# Patient Record
Sex: MALE | Race: WHITE | NOT HISPANIC OR LATINO | Employment: OTHER | URBAN - METROPOLITAN AREA
[De-identification: names, ages, dates, MRNs, and addresses within clinical notes are randomized per-mention and may not be internally consistent; named-entity substitution may affect disease eponyms.]

---

## 2024-05-31 ENCOUNTER — HOSPITAL ENCOUNTER (INPATIENT)
Facility: HOSPITAL | Age: 84
LOS: 4 days | Discharge: HOME | DRG: 392 | End: 2024-06-04
Attending: INTERNAL MEDICINE | Admitting: INTERNAL MEDICINE
Payer: MEDICARE

## 2024-05-31 ENCOUNTER — APPOINTMENT (OUTPATIENT)
Dept: RADIOLOGY | Facility: HOSPITAL | Age: 84
DRG: 392 | End: 2024-05-31
Payer: MEDICARE

## 2024-05-31 ENCOUNTER — APPOINTMENT (OUTPATIENT)
Dept: CARDIOLOGY | Facility: HOSPITAL | Age: 84
DRG: 392 | End: 2024-05-31
Payer: MEDICARE

## 2024-05-31 DIAGNOSIS — R60.0 LOWER EXTREMITY EDEMA: ICD-10-CM

## 2024-05-31 DIAGNOSIS — R06.02 SHORTNESS OF BREATH: Primary | ICD-10-CM

## 2024-05-31 DIAGNOSIS — R60.0 EDEMA OF LEFT UPPER EXTREMITY: ICD-10-CM

## 2024-05-31 DIAGNOSIS — R62.7 FAILURE TO THRIVE IN ADULT: ICD-10-CM

## 2024-05-31 LAB
ALBUMIN SERPL BCP-MCNC: 3.2 G/DL (ref 3.4–5)
ALP SERPL-CCNC: 73 U/L (ref 33–136)
ALT SERPL W P-5'-P-CCNC: 13 U/L (ref 10–52)
ANION GAP SERPL CALC-SCNC: 10 MMOL/L (ref 10–20)
APPEARANCE UR: CLEAR
AST SERPL W P-5'-P-CCNC: 24 U/L (ref 9–39)
BASOPHILS # BLD AUTO: ABNORMAL 10*3/UL
BASOPHILS NFR BLD AUTO: ABNORMAL %
BILIRUB SERPL-MCNC: 1.3 MG/DL (ref 0–1.2)
BILIRUB UR STRIP.AUTO-MCNC: NEGATIVE MG/DL
BNP SERPL-MCNC: 2235 PG/ML (ref 0–99)
BUN SERPL-MCNC: 26 MG/DL (ref 6–23)
CALCIUM SERPL-MCNC: 8.9 MG/DL (ref 8.6–10.3)
CARDIAC TROPONIN I PNL SERPL HS: 49 NG/L (ref 0–20)
CARDIAC TROPONIN I PNL SERPL HS: 60 NG/L (ref 0–20)
CHLORIDE SERPL-SCNC: 91 MMOL/L (ref 98–107)
CO2 SERPL-SCNC: 34 MMOL/L (ref 21–32)
COLOR UR: YELLOW
CREAT SERPL-MCNC: 0.78 MG/DL (ref 0.5–1.3)
EGFRCR SERPLBLD CKD-EPI 2021: 88 ML/MIN/1.73M*2
EOSINOPHIL # BLD AUTO: ABNORMAL 10*3/UL
EOSINOPHIL NFR BLD AUTO: ABNORMAL %
ERYTHROCYTE [DISTWIDTH] IN BLOOD BY AUTOMATED COUNT: 26.3 % (ref 11.5–14.5)
GLUCOSE BLD MANUAL STRIP-MCNC: 156 MG/DL (ref 74–99)
GLUCOSE SERPL-MCNC: 159 MG/DL (ref 74–99)
GLUCOSE UR STRIP.AUTO-MCNC: NORMAL MG/DL
HCT VFR BLD AUTO: 38 % (ref 41–52)
HGB BLD-MCNC: 10.9 G/DL (ref 13.5–17.5)
HYALINE CASTS #/AREA URNS AUTO: ABNORMAL /LPF
IMM GRANULOCYTES NFR BLD AUTO: 0.6 % (ref 0–0.9)
INR PPP: 1.3 (ref 0.9–1.1)
KETONES UR STRIP.AUTO-MCNC: NEGATIVE MG/DL
LACTATE SERPL-SCNC: 1.4 MMOL/L (ref 0.4–2)
LEUKOCYTE ESTERASE UR QL STRIP.AUTO: NEGATIVE
LYMPHOCYTES # BLD AUTO: ABNORMAL 10*3/UL
LYMPHOCYTES NFR BLD AUTO: ABNORMAL %
MAGNESIUM SERPL-MCNC: 1.7 MG/DL (ref 1.6–2.4)
MCH RBC QN AUTO: 24.8 PG (ref 26–34)
MCHC RBC AUTO-ENTMCNC: 28.7 G/DL (ref 32–36)
MCV RBC AUTO: 86 FL (ref 80–100)
MONOCYTES # BLD AUTO: ABNORMAL 10*3/UL
MONOCYTES NFR BLD AUTO: ABNORMAL %
MUCOUS THREADS #/AREA URNS AUTO: ABNORMAL /LPF
NEUTROPHILS # BLD AUTO: ABNORMAL 10*3/UL
NEUTROPHILS NFR BLD AUTO: ABNORMAL %
NITRITE UR QL STRIP.AUTO: NEGATIVE
NRBC BLD-RTO: 0 /100 WBCS (ref 0–0)
PH UR STRIP.AUTO: 5.5 [PH]
PLATELET # BLD AUTO: 165 X10*3/UL (ref 150–450)
POTASSIUM SERPL-SCNC: 3.6 MMOL/L (ref 3.5–5.3)
PROT SERPL-MCNC: 6.1 G/DL (ref 6.4–8.2)
PROT UR STRIP.AUTO-MCNC: NORMAL MG/DL
PROTHROMBIN TIME: 14.7 SECONDS (ref 9.8–12.8)
RBC # BLD AUTO: 4.4 X10*6/UL (ref 4.5–5.9)
RBC # UR STRIP.AUTO: NEGATIVE /UL
RBC #/AREA URNS AUTO: ABNORMAL /HPF
SARS-COV-2 RNA RESP QL NAA+PROBE: NOT DETECTED
SODIUM SERPL-SCNC: 131 MMOL/L (ref 136–145)
SP GR UR STRIP.AUTO: 1.02
UROBILINOGEN UR STRIP.AUTO-MCNC: NORMAL MG/DL
WBC # BLD AUTO: 6.4 X10*3/UL (ref 4.4–11.3)
WBC #/AREA URNS AUTO: ABNORMAL /HPF

## 2024-05-31 PROCEDURE — 84075 ASSAY ALKALINE PHOSPHATASE: CPT | Performed by: INTERNAL MEDICINE

## 2024-05-31 PROCEDURE — 84443 ASSAY THYROID STIM HORMONE: CPT | Performed by: HOSPITALIST

## 2024-05-31 PROCEDURE — 83540 ASSAY OF IRON: CPT | Performed by: HOSPITALIST

## 2024-05-31 PROCEDURE — 81001 URINALYSIS AUTO W/SCOPE: CPT | Performed by: INTERNAL MEDICINE

## 2024-05-31 PROCEDURE — 36415 COLL VENOUS BLD VENIPUNCTURE: CPT | Performed by: INTERNAL MEDICINE

## 2024-05-31 PROCEDURE — 84484 ASSAY OF TROPONIN QUANT: CPT | Performed by: INTERNAL MEDICINE

## 2024-05-31 PROCEDURE — 71045 X-RAY EXAM CHEST 1 VIEW: CPT | Mod: FOREIGN READ | Performed by: RADIOLOGY

## 2024-05-31 PROCEDURE — 2500000001 HC RX 250 WO HCPCS SELF ADMINISTERED DRUGS (ALT 637 FOR MEDICARE OP): Performed by: INTERNAL MEDICINE

## 2024-05-31 PROCEDURE — 83735 ASSAY OF MAGNESIUM: CPT | Performed by: INTERNAL MEDICINE

## 2024-05-31 PROCEDURE — 83880 ASSAY OF NATRIURETIC PEPTIDE: CPT | Performed by: INTERNAL MEDICINE

## 2024-05-31 PROCEDURE — 82746 ASSAY OF FOLIC ACID SERUM: CPT | Mod: AHULAB | Performed by: HOSPITALIST

## 2024-05-31 PROCEDURE — 93970 EXTREMITY STUDY: CPT

## 2024-05-31 PROCEDURE — 93005 ELECTROCARDIOGRAM TRACING: CPT

## 2024-05-31 PROCEDURE — 71045 X-RAY EXAM CHEST 1 VIEW: CPT

## 2024-05-31 PROCEDURE — 99223 1ST HOSP IP/OBS HIGH 75: CPT | Performed by: HOSPITALIST

## 2024-05-31 PROCEDURE — 99285 EMERGENCY DEPT VISIT HI MDM: CPT | Mod: 25

## 2024-05-31 PROCEDURE — 1200000002 HC GENERAL ROOM WITH TELEMETRY DAILY

## 2024-05-31 PROCEDURE — 83605 ASSAY OF LACTIC ACID: CPT | Performed by: INTERNAL MEDICINE

## 2024-05-31 PROCEDURE — 82947 ASSAY GLUCOSE BLOOD QUANT: CPT

## 2024-05-31 PROCEDURE — 87635 SARS-COV-2 COVID-19 AMP PRB: CPT | Performed by: INTERNAL MEDICINE

## 2024-05-31 PROCEDURE — 85610 PROTHROMBIN TIME: CPT | Performed by: INTERNAL MEDICINE

## 2024-05-31 RX ORDER — ACETAMINOPHEN 325 MG/1
650 TABLET ORAL EVERY 4 HOURS PRN
Status: DISCONTINUED | OUTPATIENT
Start: 2024-05-31 | End: 2024-06-04 | Stop reason: HOSPADM

## 2024-05-31 RX ORDER — DULOXETIN HYDROCHLORIDE 20 MG/1
20 CAPSULE, DELAYED RELEASE ORAL 2 TIMES DAILY
Status: DISCONTINUED | OUTPATIENT
Start: 2024-05-31 | End: 2024-06-01 | Stop reason: DRUGHIGH

## 2024-05-31 RX ORDER — ACETAMINOPHEN 650 MG/1
650 SUPPOSITORY RECTAL EVERY 4 HOURS PRN
Status: DISCONTINUED | OUTPATIENT
Start: 2024-05-31 | End: 2024-06-04 | Stop reason: HOSPADM

## 2024-05-31 RX ORDER — LEVOTHYROXINE SODIUM 75 UG/1
75 TABLET ORAL DAILY
Status: DISCONTINUED | OUTPATIENT
Start: 2024-06-01 | End: 2024-06-04 | Stop reason: HOSPADM

## 2024-05-31 RX ORDER — ONDANSETRON HYDROCHLORIDE 2 MG/ML
4 INJECTION, SOLUTION INTRAVENOUS EVERY 8 HOURS PRN
Status: DISCONTINUED | OUTPATIENT
Start: 2024-05-31 | End: 2024-06-04 | Stop reason: HOSPADM

## 2024-05-31 RX ORDER — TAMSULOSIN HYDROCHLORIDE 0.4 MG/1
0.4 CAPSULE ORAL DAILY
Status: DISCONTINUED | OUTPATIENT
Start: 2024-06-01 | End: 2024-06-04 | Stop reason: HOSPADM

## 2024-05-31 RX ORDER — ONDANSETRON 4 MG/1
4 TABLET, ORALLY DISINTEGRATING ORAL EVERY 8 HOURS PRN
Status: DISCONTINUED | OUTPATIENT
Start: 2024-05-31 | End: 2024-06-04 | Stop reason: HOSPADM

## 2024-05-31 RX ORDER — POLYETHYLENE GLYCOL 3350 17 G/17G
17 POWDER, FOR SOLUTION ORAL DAILY
Status: DISCONTINUED | OUTPATIENT
Start: 2024-06-01 | End: 2024-06-04 | Stop reason: HOSPADM

## 2024-05-31 RX ORDER — PANTOPRAZOLE SODIUM 40 MG/10ML
40 INJECTION, POWDER, LYOPHILIZED, FOR SOLUTION INTRAVENOUS DAILY
Status: DISCONTINUED | OUTPATIENT
Start: 2024-06-01 | End: 2024-06-04 | Stop reason: HOSPADM

## 2024-05-31 RX ORDER — ATORVASTATIN CALCIUM 40 MG/1
40 TABLET, FILM COATED ORAL ONCE
Status: COMPLETED | OUTPATIENT
Start: 2024-05-31 | End: 2024-05-31

## 2024-05-31 RX ORDER — DULOXETIN HYDROCHLORIDE 20 MG/1
20 CAPSULE, DELAYED RELEASE ORAL DAILY
Status: DISCONTINUED | OUTPATIENT
Start: 2024-06-01 | End: 2024-05-31

## 2024-05-31 RX ORDER — INSULIN GLARGINE 100 [IU]/ML
3 INJECTION, SOLUTION SUBCUTANEOUS EVERY 24 HOURS
Status: DISCONTINUED | OUTPATIENT
Start: 2024-05-31 | End: 2024-06-04 | Stop reason: HOSPADM

## 2024-05-31 RX ORDER — ASPIRIN 81 MG/1
81 TABLET ORAL DAILY
Status: DISCONTINUED | OUTPATIENT
Start: 2024-06-01 | End: 2024-06-04 | Stop reason: HOSPADM

## 2024-05-31 RX ORDER — TALC
3 POWDER (GRAM) TOPICAL ONCE
Status: DISCONTINUED | OUTPATIENT
Start: 2024-06-01 | End: 2024-05-31

## 2024-05-31 RX ORDER — DEXTROSE 50 % IN WATER (D50W) INTRAVENOUS SYRINGE
12.5
Status: DISCONTINUED | OUTPATIENT
Start: 2024-05-31 | End: 2024-06-04 | Stop reason: HOSPADM

## 2024-05-31 RX ORDER — METOPROLOL SUCCINATE 25 MG/1
25 TABLET, EXTENDED RELEASE ORAL DAILY
Status: DISCONTINUED | OUTPATIENT
Start: 2024-06-01 | End: 2024-05-31

## 2024-05-31 RX ORDER — ENOXAPARIN SODIUM 100 MG/ML
40 INJECTION SUBCUTANEOUS EVERY 24 HOURS
Status: DISCONTINUED | OUTPATIENT
Start: 2024-05-31 | End: 2024-05-31

## 2024-05-31 RX ORDER — TALC
3 POWDER (GRAM) TOPICAL ONCE
Status: COMPLETED | OUTPATIENT
Start: 2024-05-31 | End: 2024-05-31

## 2024-05-31 RX ORDER — TALC
3 POWDER (GRAM) TOPICAL DAILY
Status: DISCONTINUED | OUTPATIENT
Start: 2024-06-01 | End: 2024-06-04 | Stop reason: HOSPADM

## 2024-05-31 RX ORDER — METOPROLOL SUCCINATE 25 MG/1
12.5 TABLET, EXTENDED RELEASE ORAL 2 TIMES DAILY
Status: DISCONTINUED | OUTPATIENT
Start: 2024-05-31 | End: 2024-06-04 | Stop reason: HOSPADM

## 2024-05-31 RX ORDER — SODIUM CHLORIDE 9 MG/ML
75 INJECTION, SOLUTION INTRAVENOUS CONTINUOUS
Status: ACTIVE | OUTPATIENT
Start: 2024-05-31 | End: 2024-06-01

## 2024-05-31 RX ORDER — DEXTROSE 50 % IN WATER (D50W) INTRAVENOUS SYRINGE
25
Status: DISCONTINUED | OUTPATIENT
Start: 2024-05-31 | End: 2024-06-04 | Stop reason: HOSPADM

## 2024-05-31 RX ORDER — FERROUS SULFATE, DRIED 160(50) MG
1 TABLET, EXTENDED RELEASE ORAL DAILY
Status: DISCONTINUED | OUTPATIENT
Start: 2024-06-01 | End: 2024-06-04 | Stop reason: HOSPADM

## 2024-05-31 RX ORDER — INSULIN LISPRO 100 [IU]/ML
0-10 INJECTION, SOLUTION INTRAVENOUS; SUBCUTANEOUS EVERY 4 HOURS
Status: DISCONTINUED | OUTPATIENT
Start: 2024-05-31 | End: 2024-06-04 | Stop reason: HOSPADM

## 2024-05-31 RX ADMIN — ATORVASTATIN CALCIUM 40 MG: 40 TABLET, FILM COATED ORAL at 21:54

## 2024-05-31 RX ADMIN — Medication 3 MG: at 21:54

## 2024-05-31 SDOH — ECONOMIC STABILITY: TRANSPORTATION INSECURITY
IN THE PAST 12 MONTHS, HAS LACK OF TRANSPORTATION KEPT YOU FROM MEETINGS, WORK, OR FROM GETTING THINGS NEEDED FOR DAILY LIVING?: NO

## 2024-05-31 SDOH — ECONOMIC STABILITY: INCOME INSECURITY: HOW HARD IS IT FOR YOU TO PAY FOR THE VERY BASICS LIKE FOOD, HOUSING, MEDICAL CARE, AND HEATING?: NOT HARD AT ALL

## 2024-05-31 SDOH — SOCIAL STABILITY: SOCIAL INSECURITY: HAVE YOU HAD ANY THOUGHTS OF HARMING ANYONE ELSE?: NO

## 2024-05-31 SDOH — SOCIAL STABILITY: SOCIAL INSECURITY: DOES ANYONE TRY TO KEEP YOU FROM HAVING/CONTACTING OTHER FRIENDS OR DOING THINGS OUTSIDE YOUR HOME?: NO

## 2024-05-31 SDOH — SOCIAL STABILITY: SOCIAL INSECURITY: ABUSE: ADULT

## 2024-05-31 SDOH — SOCIAL STABILITY: SOCIAL INSECURITY: DO YOU FEEL ANYONE HAS EXPLOITED OR TAKEN ADVANTAGE OF YOU FINANCIALLY OR OF YOUR PERSONAL PROPERTY?: NO

## 2024-05-31 SDOH — ECONOMIC STABILITY: INCOME INSECURITY: IN THE LAST 12 MONTHS, WAS THERE A TIME WHEN YOU WERE NOT ABLE TO PAY THE MORTGAGE OR RENT ON TIME?: NO

## 2024-05-31 SDOH — SOCIAL STABILITY: SOCIAL INSECURITY: DO YOU FEEL UNSAFE GOING BACK TO THE PLACE WHERE YOU ARE LIVING?: NO

## 2024-05-31 SDOH — ECONOMIC STABILITY: HOUSING INSECURITY
IN THE LAST 12 MONTHS, WAS THERE A TIME WHEN YOU DID NOT HAVE A STEADY PLACE TO SLEEP OR SLEPT IN A SHELTER (INCLUDING NOW)?: NO

## 2024-05-31 SDOH — SOCIAL STABILITY: SOCIAL INSECURITY: ARE YOU OR HAVE YOU BEEN THREATENED OR ABUSED PHYSICALLY, EMOTIONALLY, OR SEXUALLY BY ANYONE?: NO

## 2024-05-31 SDOH — ECONOMIC STABILITY: TRANSPORTATION INSECURITY
IN THE PAST 12 MONTHS, HAS THE LACK OF TRANSPORTATION KEPT YOU FROM MEDICAL APPOINTMENTS OR FROM GETTING MEDICATIONS?: NO

## 2024-05-31 SDOH — SOCIAL STABILITY: SOCIAL INSECURITY: WERE YOU ABLE TO COMPLETE ALL THE BEHAVIORAL HEALTH SCREENINGS?: YES

## 2024-05-31 SDOH — ECONOMIC STABILITY: HOUSING INSECURITY: IN THE LAST 12 MONTHS, HOW MANY PLACES HAVE YOU LIVED?: 1

## 2024-05-31 SDOH — SOCIAL STABILITY: SOCIAL INSECURITY: HAS ANYONE EVER THREATENED TO HURT YOUR FAMILY OR YOUR PETS?: NO

## 2024-05-31 SDOH — SOCIAL STABILITY: SOCIAL INSECURITY: ARE THERE ANY APPARENT SIGNS OF INJURIES/BEHAVIORS THAT COULD BE RELATED TO ABUSE/NEGLECT?: NO

## 2024-05-31 SDOH — SOCIAL STABILITY: SOCIAL INSECURITY: HAVE YOU HAD THOUGHTS OF HARMING ANYONE ELSE?: NO

## 2024-05-31 ASSESSMENT — COGNITIVE AND FUNCTIONAL STATUS - GENERAL
DRESSING REGULAR LOWER BODY CLOTHING: A LOT
EATING MEALS: A LOT
HELP NEEDED FOR BATHING: A LOT
MOBILITY SCORE: 9
PATIENT BASELINE BEDBOUND: NO
MOVING TO AND FROM BED TO CHAIR: A LOT
WALKING IN HOSPITAL ROOM: TOTAL
CLIMB 3 TO 5 STEPS WITH RAILING: TOTAL
STANDING UP FROM CHAIR USING ARMS: TOTAL
MOVING FROM LYING ON BACK TO SITTING ON SIDE OF FLAT BED WITH BEDRAILS: A LOT
DAILY ACTIVITIY SCORE: 12
TURNING FROM BACK TO SIDE WHILE IN FLAT BAD: A LOT
DRESSING REGULAR UPPER BODY CLOTHING: A LOT
TOILETING: A LOT
PERSONAL GROOMING: A LOT

## 2024-05-31 ASSESSMENT — COLUMBIA-SUICIDE SEVERITY RATING SCALE - C-SSRS
6. HAVE YOU EVER DONE ANYTHING, STARTED TO DO ANYTHING, OR PREPARED TO DO ANYTHING TO END YOUR LIFE?: NO
2. HAVE YOU ACTUALLY HAD ANY THOUGHTS OF KILLING YOURSELF?: NO
1. IN THE PAST MONTH, HAVE YOU WISHED YOU WERE DEAD OR WISHED YOU COULD GO TO SLEEP AND NOT WAKE UP?: NO

## 2024-05-31 ASSESSMENT — ACTIVITIES OF DAILY LIVING (ADL)
WALKS IN HOME: DEPENDENT
ASSISTIVE_DEVICE: WHEELCHAIR;WALKER
DRESSING YOURSELF: NEEDS ASSISTANCE
FEEDING YOURSELF: NEEDS ASSISTANCE
JUDGMENT_ADEQUATE_SAFELY_COMPLETE_DAILY_ACTIVITIES: NO
HEARING - LEFT EAR: FUNCTIONAL
ADEQUATE_TO_COMPLETE_ADL: YES
TOILETING: NEEDS ASSISTANCE
PATIENT'S MEMORY ADEQUATE TO SAFELY COMPLETE DAILY ACTIVITIES?: NO
BATHING: NEEDS ASSISTANCE
HEARING - RIGHT EAR: FUNCTIONAL
GROOMING: NEEDS ASSISTANCE

## 2024-05-31 ASSESSMENT — PATIENT HEALTH QUESTIONNAIRE - PHQ9
2. FEELING DOWN, DEPRESSED OR HOPELESS: NOT AT ALL
SUM OF ALL RESPONSES TO PHQ9 QUESTIONS 1 & 2: 0
1. LITTLE INTEREST OR PLEASURE IN DOING THINGS: NOT AT ALL

## 2024-05-31 ASSESSMENT — LIFESTYLE VARIABLES
AUDIT-C TOTAL SCORE: 0
AUDIT-C TOTAL SCORE: 0
HOW OFTEN DO YOU HAVE 6 OR MORE DRINKS ON ONE OCCASION: NEVER
HOW OFTEN DO YOU HAVE A DRINK CONTAINING ALCOHOL: NEVER
HOW MANY STANDARD DRINKS CONTAINING ALCOHOL DO YOU HAVE ON A TYPICAL DAY: PATIENT DOES NOT DRINK
SKIP TO QUESTIONS 9-10: 1

## 2024-05-31 ASSESSMENT — PAIN SCALES - GENERAL
PAINLEVEL_OUTOF10: 0 - NO PAIN
PAINLEVEL_OUTOF10: 0 - NO PAIN

## 2024-05-31 ASSESSMENT — PAIN - FUNCTIONAL ASSESSMENT: PAIN_FUNCTIONAL_ASSESSMENT: 0-10

## 2024-05-31 NOTE — ED PROVIDER NOTES
"HPI     CC: Shortness of Breath     HPI: Maverick Tai is a 83 y.o. male with a history of T2DM, CAD, AF on warfarin, HFpEF, acute on chronic hypoxic respiratory failure on home O2, multiple valve replacements c/b moderate to large pericardial effusion, HTN, HLD, GIB 2/2 angioectasia, hypothyroidism, moderate PCM, thrombocytopenia, anxiety/depression, hypogammaglobulinemia, adrenal adenoma, migraine, right dropfoot, lumbar stenosis, KATEY, CKD 3, recurrent falls, GERD, BPH, hypothyroidism, recent admission at [facility] for new transaminitis, aspiration pneumonia and GI bleed managed medically, discharged 5/29 to home, presents with report of shortness of breath.  Patient is confused, states he does not know if he is short of breath or not.  He is perseverating on needing to urinate before answering any questions.  He denies any pain currently.  He cannot tell me where he lives, states \"I believe I am in an extended area of swollen or nearby.\"  Admits to feeling tired.  Per recent DC summary from 5/29 from [facility], patient presented with severe anemia, change his mind multiple times about whether to proceed with endoscopic evaluation, they were considering hospice but decided to hold off.    ROS: 10-point review of systems was performed and is otherwise negative except as noted in HPI.    Limitations to history: Confusion    Independent Historians: None available    External Records Reviewed: Prior DC summary from [facility] 5/29/2023    Past Medical History: Noncontributory except per HPI     Past Surgical History: Noncontributory except per HPI     Family History: Reviewed and noncontributory     Social History:  Denies tobacco. Denies ETOH. Denies illicit drugs.    Social Determinants Affecting Care: N/A    Allergies   Allergen Reactions    Iodine Other     Other Reaction(s): Other: See Comments      flushing= radioactive iodine, not topoical    flushing= radioactive iodine, not topoical      Other Reaction(s): Other: See " "Comments  flushing= radioactive iodine, not topoical    Penicillins Other     Pt states he has a extreme penicillin toxicity.    ABSOLUTE  HEP INDUCED    Pt states he has a extreme penicillin toxicity.      ABSOLUTE  HEP INDUCED    Bee Pollen Other     Environmental: Sneezing, runny nose, watery eyes    Cephalosporins Other     Concern about potential liver problems    Cetirizine Other     increased LFT    Ciprofloxacin Other     possible liver toxcicity    Clarithromycin Other     possible liver problem.    Desloratadine GI intolerance, GI Upset and Other     \"hyper\"    Diphenhydramine Other     \"hyper\"    Doxycycline Other     possible liver dysfunction after administration.    Gentamicin Other     Possible per pt causes increases symptoms of tinnitus    Nickel Other     patch test confirmed by     Sitagliptin Rash     rash in inner thighs       Home Meds: No current outpatient medications     Physical Exam     ED Triage Vitals [05/31/24 1533]   Temperature Heart Rate Respirations BP   36.5 °C (97.7 °F) 95 18 133/74      Pulse Ox Temp Source Heart Rate Source Patient Position   94 % Tympanic Monitor Sitting      BP Location FiO2 (%)     Left arm --         Heart Rate:  [82-97]   Temperature:  [36.5 °C (97.7 °F)-36.9 °C (98.5 °F)]   Respirations:  [17-20]   BP: (121-143)/(61-95)   Height:  [177.8 cm (5' 10\")]   Weight:  [90.7 kg (200 lb)]   Pulse Ox:  [94 %-98 %]      Physical Exam  Vitals and nursing note reviewed.     CONSTITUTIONAL: Chronically ill-appearing elderly male in no acute distress.     HENMT: Head atraumatic. Airway patent. Nasal mucosa clear. Mouth with normal mucosa, clear oropharynx. Uvula midline. Neck supple.    EYES: Clear bilaterally, pupils equally round and reactive to light.   CARDIOVASCULAR: Normal rate, irregularly irregular rhythm.  Heart sounds S1, S2.  No murmurs, rubs or gallops. Normal pulses. Capillary refill < 2 sec.   RESPIRATORY: No increased work of breathing. Breath sounds " clear and equal bilaterally except for faint bibasilar crackles.  GASTROINTESTINAL: Abdomen soft, non-distended, non-tender. No rebound, no guarding. Normal bowel sounds.  Reducible ventral hernias.  GENITOURINARY:  No CVA tenderness.  MUSCULOSKELETAL: Spine appears normal, range of motion is not limited, no muscle or joint tenderness. No edema.  Anasarca, 3+ BLE edema bilateral lower extremities.  NEUROLOGICAL: Alert and oriented only to self, no asymmetry, moving all extremities equally.  SKIN: Warm, dry and intact. No rash or notable lesions.  PSYCHIATRIC: Slightly irritable.  HEME/LYMPH: No adenopathy or splenomegaly.    Diagnostic Results      ECG: ECGs read and interpreted by me. See ED Course, below, for interpretation.    Labs Reviewed   CBC WITH AUTO DIFFERENTIAL - Abnormal       Result Value    WBC 6.4      nRBC 0.0      RBC 4.40 (*)     Hemoglobin 10.9 (*)     Hematocrit 38.0 (*)     MCV 86      MCH 24.8 (*)     MCHC 28.7 (*)     RDW 26.3 (*)     Platelets 165      Neutrophils %        Immature Granulocytes %, Automated 0.6      Lymphocytes %        Monocytes %        Eosinophils %        Basophils %        Neutrophils Absolute        Lymphocytes Absolute        Monocytes Absolute        Eosinophils Absolute        Basophils Absolute       COMPREHENSIVE METABOLIC PANEL - Abnormal    Glucose 159 (*)     Sodium 131 (*)     Potassium 3.6      Chloride 91 (*)     Bicarbonate 34 (*)     Anion Gap 10      Urea Nitrogen 26 (*)     Creatinine 0.78      eGFR 88      Calcium 8.9      Albumin 3.2 (*)     Alkaline Phosphatase 73      Total Protein 6.1 (*)     AST 24      Bilirubin, Total 1.3 (*)     ALT 13     PROTIME-INR - Abnormal    Protime 14.7 (*)     INR 1.3 (*)    B-TYPE NATRIURETIC PEPTIDE - Abnormal    BNP 2,235 (*)     Narrative:        <100 pg/mL - Heart failure unlikely  100-299 pg/mL - Intermediate probability of acute heart                  failure exacerbation. Correlate with clinical                   context and patient history.    >=300 pg/mL - Heart Failure likely. Correlate with clinical                  context and patient history.    BNP testing is performed using different testing methodology at [facility] than at other [facility]. Direct result comparisons should only be made within the same method.      SERIAL TROPONIN-INITIAL - Abnormal    Troponin I, High Sensitivity 60 (*)     Narrative:     Less than 99th percentile of normal range cutoff-  Female and children under 18 years old <14 ng/L; Male <21 ng/L: Negative  Repeat testing should be performed if clinically indicated.     Female and children under 18 years old 14-50 ng/L; Male 21-50 ng/L:  Consistent with possible cardiac damage and possible increased clinical   risk. Serial measurements may help to assess extent of myocardial damage.     >50 ng/L: Consistent with cardiac damage, increased clinical risk and  myocardial infarction. Serial measurements may help assess extent of   myocardial damage.      NOTE: Children less than 1 year old may have higher baseline troponin   levels and results should be interpreted in conjunction with the overall   clinical context.     NOTE: Troponin I testing is performed using a different   testing methodology at [facility] than at other   [facility]. Direct result comparisons should only   be made within the same method.   SERIAL TROPONIN, 1 HOUR - Abnormal    Troponin I, High Sensitivity 49 (*)     Narrative:     Less than 99th percentile of normal range cutoff-  Female and children under 18 years old <14 ng/L; Male <21 ng/L: Negative  Repeat testing should be performed if clinically indicated.     Female and children under 18 years old 14-50 ng/L; Male 21-50 ng/L:  Consistent with possible cardiac damage and possible increased clinical   risk. Serial measurements may help to assess extent of myocardial damage.     >50 ng/L: Consistent with cardiac damage, increased  clinical risk and  myocardial infarction. Serial measurements may help assess extent of   myocardial damage.      NOTE: Children less than 1 year old may have higher baseline troponin   levels and results should be interpreted in conjunction with the overall   clinical context.     NOTE: Troponin I testing is performed using a different   testing methodology at [facility] than at other   [facility] hospitals. Direct result comparisons should only   be made within the same method.   URINALYSIS MICROSCOPIC WITH REFLEX CULTURE - Abnormal    WBC, Urine 1-5      RBC, Urine 1-2      Mucus, Urine FEW      Hyaline Casts, Urine 1+ (*)    MAGNESIUM - Normal    Magnesium 1.70     SARS-COV-2 PCR - Normal    Coronavirus 2019, PCR Not Detected      Narrative:     This assay has received FDA Emergency Use Authorization (EUA) and is only authorized for the duration of time that circumstances exist to justify the authorization of the emergency use of in vitro diagnostic tests for the detection of SARS-CoV-2 virus and/or diagnosis of COVID-19 infection under section 564(b)(1) of the Act, 21 U.S.C. 360bbb-3(b)(1). This assay is an in vitro diagnostic nucleic acid amplification test for the qualitative detection of SARS-CoV-2 from nasopharyngeal specimens and has been validated for use at [facility]. Negative results do not preclude COVID-19 infections and should not be used as the sole basis for diagnosis, treatment, or other management decisions.     LACTATE - Normal    Lactate 1.4      Narrative:     Venipuncture immediately after or during the administration of Metamizole may lead to falsely low results. Testing should be performed immediately  prior to Metamizole dosing.   URINALYSIS WITH REFLEX CULTURE AND MICROSCOPIC - Normal    Color, Urine Yellow      Appearance, Urine Clear      Specific Gravity, Urine 1.019      pH, Urine 5.5      Protein, Urine 20 (TRACE)      Glucose, Urine Normal       Blood, Urine NEGATIVE      Ketones, Urine NEGATIVE      Bilirubin, Urine NEGATIVE      Urobilinogen, Urine Normal      Nitrite, Urine NEGATIVE      Leukocyte Esterase, Urine NEGATIVE     TROPONIN SERIES- (INITIAL, 1 HR)    Narrative:     The following orders were created for panel order Troponin I Series, High Sensitivity (0, 1 HR).  Procedure                               Abnormality         Status                     ---------                               -----------         ------                     Troponin I, High Sensiti...[[id number]]  Abnormal            Final result               Troponin, High Sensitivi...[[id number]]  Abnormal            Final result                 Please view results for these tests on the individual orders.   URINALYSIS WITH REFLEX CULTURE AND MICROSCOPIC    Narrative:     The following orders were created for panel order Urinalysis with Reflex Culture and Microscopic.  Procedure                               Abnormality         Status                     ---------                               -----------         ------                     Urinalysis with Reflex C...[[id number]]  Normal              Final result               Extra Urine Gray Tube[[id number]]                            In process                   Please view results for these tests on the individual orders.   EXTRA URINE GRAY TUBE         XR chest 1 view   Final Result   1. No acute pulmonary pathology.   2. Atelectasis in the right lower lobe.   Signed by Scottie Barboza MD                    [place] Coma Scale Score: 14                  Procedure  Procedures    ED Course & MDM   Assessment/Plan:   Maverick Tai is a 83 y.o. male with a history of T2DM, CAD, AF on warfarin, HFpEF, acute on chronic hypoxic respiratory failure on home O2, multiple valve replacements c/b moderate to large pericardial effusion, HTN, HLD, GIB 2/2 angioectasia, hypothyroidism, moderate PCM, thrombocytopenia, anxiety/depression,  hypogammaglobulinemia, adrenal adenoma, migraine, right dropfoot, lumbar stenosis, KATEY, CKD 3, recurrent falls, GERD, BPH, hypothyroidism, recent admission at [facility] for new transaminitis, aspiration pneumonia and GI bleed managed medically, discharged 5/29 to home, presents with report of shortness of breath although patient cannot provide a history due to confusion which is reportedly not new and denies shortness of breath at this time.  He is hemodynamically stable and saturating well on room air.  Differential is broad, including worsening CHF, anemia, metabolic derangement, occult infection.  Will need to obtain collateral from his wife if possible.  Workup was initiated with ECG, labs, chest x-ray. See below for details of ED course and ultimate disposition.    Medications   atorvastatin (Lipitor) tablet 40 mg (has no administration in time range)   melatonin tablet 3 mg (has no administration in time range)        ED Course as of 05/31/24 2109   Fri May 31, 2024   1616 Attempted to call home phone for collateral but there was no answer.  [CG]   1627 ECG read interpreted by me.  Atrial fibrillation, rate 85.  Normal axis.  Normal intervals.  No ST or T wave derangements. [CG]   1659 Initial labs notable for CBC without leukocytosis, improved anemia 10.9, normal platelets, CMP with hyperglycemia 159 (no DKA), mild hyponatremia 131, hypochloremia 91, elevated bicarb 34, elevated BUN 26 with normal creatinine 0.78, grossly normal LFTs except for mildly elevated total serum bilirubin 1.3, negative COVID swab, normal lactate, normal magnesium 1.7, stably elevated troponin 60 (116>103 on 5/16), subtherapeutic INR 1.3. [CG]   1728 BNP 2,235 from 11,255 on 5/16 [CG]   1748 Chest x-ray shows no acute pulmonary pathology, atelectasis in the right lower lobe [CG]   1841 Spoke with wife, patient has been delirious since hospital discharge.  They were unable to transfer him from the wheelchair to the bed so they called EMS.   EMS was unable to obtain a pulse ox above 66% and so decided to bring him to the ED.  Wife does not feel she can manage him at home but also does not want him to go to a facility.  He has not really been able to take his medications at home.  He was supposed to go home with oxygen but it was canceled.  She is still on the fence about hospice.  She would like him admitted for further planning. [CG]   1843 UA negative.  [CG]   2109 Patient was admitted under Dr. Chavarria for further management. [CG]      ED Course User Index  [CG] Tiesha Singletary MD         Diagnoses as of 05/31/24 2109   Shortness of breath   Failure to thrive in adult       Disposition:   Admitted to IM team, discussed differential and findings with patient as well as any family members at bedside.      ED Prescriptions    None         Tiesha Singletary MD  EM/IM/Peds    This note was dictated by speech recognition. Minor errors in transcription may be present.     Tiesha Singletary MD  05/31/24 2109

## 2024-05-31 NOTE — ED TRIAGE NOTES
Pt ws brought in by ems from home for shortness of breath. Pt was unable to answer many questions, pt had lower extremity bilateral edema, EMS states that pt was found laying flat on scene. Pt lives at home with his wife and has a home health aid.  Pt was discharged from [facility] on Wednesday for pneumonia.

## 2024-06-01 ENCOUNTER — APPOINTMENT (OUTPATIENT)
Dept: RADIOLOGY | Facility: HOSPITAL | Age: 84
DRG: 392 | End: 2024-06-01
Payer: MEDICARE

## 2024-06-01 LAB
ANION GAP SERPL CALC-SCNC: 8 MMOL/L (ref 10–20)
BASOPHILS # BLD AUTO: 0.04 X10*3/UL (ref 0–0.1)
BASOPHILS NFR BLD AUTO: 0.6 %
BUN SERPL-MCNC: 24 MG/DL (ref 6–23)
CALCIUM SERPL-MCNC: 9 MG/DL (ref 8.6–10.3)
CHLORIDE SERPL-SCNC: 94 MMOL/L (ref 98–107)
CO2 SERPL-SCNC: 37 MMOL/L (ref 21–32)
CREAT SERPL-MCNC: 0.79 MG/DL (ref 0.5–1.3)
D DIMER PPP FEU-MCNC: 1560 NG/ML FEU
EGFRCR SERPLBLD CKD-EPI 2021: 88 ML/MIN/1.73M*2
EOSINOPHIL # BLD AUTO: 0.27 X10*3/UL (ref 0–0.4)
EOSINOPHIL NFR BLD AUTO: 4.2 %
ERYTHROCYTE [DISTWIDTH] IN BLOOD BY AUTOMATED COUNT: 25.7 % (ref 11.5–14.5)
EST. AVERAGE GLUCOSE BLD GHB EST-MCNC: 94 MG/DL
FOLATE SERPL-MCNC: 21.6 NG/ML
GLUCOSE BLD MANUAL STRIP-MCNC: 117 MG/DL (ref 74–99)
GLUCOSE BLD MANUAL STRIP-MCNC: 124 MG/DL (ref 74–99)
GLUCOSE BLD MANUAL STRIP-MCNC: 136 MG/DL (ref 74–99)
GLUCOSE BLD MANUAL STRIP-MCNC: 183 MG/DL (ref 74–99)
GLUCOSE SERPL-MCNC: 139 MG/DL (ref 74–99)
HBA1C MFR BLD: 4.9 %
HCT VFR BLD AUTO: 34.7 % (ref 41–52)
HGB BLD-MCNC: 10 G/DL (ref 13.5–17.5)
HOLD SPECIMEN: NORMAL
HYPOCHROMIA BLD QL SMEAR: NORMAL
IMM GRANULOCYTES # BLD AUTO: 0.04 X10*3/UL (ref 0–0.5)
IMM GRANULOCYTES NFR BLD AUTO: 0.6 % (ref 0–0.9)
IRON SATN MFR SERPL: 17 % (ref 25–45)
IRON SERPL-MCNC: 45 UG/DL (ref 35–150)
LYMPHOCYTES # BLD AUTO: 0.42 X10*3/UL (ref 0.8–3)
LYMPHOCYTES NFR BLD AUTO: 6.6 %
MAGNESIUM SERPL-MCNC: 1.9 MG/DL (ref 1.6–2.4)
MCH RBC QN AUTO: 24.8 PG (ref 26–34)
MCHC RBC AUTO-ENTMCNC: 28.8 G/DL (ref 32–36)
MCV RBC AUTO: 86 FL (ref 80–100)
MONOCYTES # BLD AUTO: 0.26 X10*3/UL (ref 0.05–0.8)
MONOCYTES NFR BLD AUTO: 4.1 %
NEUTROPHILS # BLD AUTO: 5.33 X10*3/UL (ref 1.6–5.5)
NEUTROPHILS NFR BLD AUTO: 83.9 %
NRBC BLD-RTO: 0 /100 WBCS (ref 0–0)
PLATELET # BLD AUTO: 155 X10*3/UL (ref 150–450)
POLYCHROMASIA BLD QL SMEAR: NORMAL
POTASSIUM SERPL-SCNC: 3.6 MMOL/L (ref 3.5–5.3)
Q ONSET: 219 MS
QRS COUNT: 14 BEATS
QRS DURATION: 90 MS
QT INTERVAL: 352 MS
QTC CALCULATION(BAZETT): 418 MS
QTC FREDERICIA: 395 MS
R AXIS: 49 DEGREES
RBC # BLD AUTO: 4.04 X10*6/UL (ref 4.5–5.9)
RBC MORPH BLD: NORMAL
SODIUM SERPL-SCNC: 135 MMOL/L (ref 136–145)
T AXIS: 66 DEGREES
T OFFSET: 395 MS
TIBC SERPL-MCNC: 263 UG/DL (ref 240–445)
TSH SERPL-ACNC: 6.06 MIU/L (ref 0.44–3.98)
UIBC SERPL-MCNC: 218 UG/DL (ref 110–370)
VENTRICULAR RATE: 85 BPM
VIT B12 SERPL-MCNC: 1054 PG/ML (ref 211–911)
WBC # BLD AUTO: 6.4 X10*3/UL (ref 4.4–11.3)

## 2024-06-01 PROCEDURE — 2500000002 HC RX 250 W HCPCS SELF ADMINISTERED DRUGS (ALT 637 FOR MEDICARE OP, ALT 636 FOR OP/ED): Performed by: HOSPITALIST

## 2024-06-01 PROCEDURE — 2500000004 HC RX 250 GENERAL PHARMACY W/ HCPCS (ALT 636 FOR OP/ED): Performed by: HOSPITALIST

## 2024-06-01 PROCEDURE — 2500000001 HC RX 250 WO HCPCS SELF ADMINISTERED DRUGS (ALT 637 FOR MEDICARE OP): Performed by: HOSPITALIST

## 2024-06-01 PROCEDURE — C9113 INJ PANTOPRAZOLE SODIUM, VIA: HCPCS | Performed by: HOSPITALIST

## 2024-06-01 PROCEDURE — 83036 HEMOGLOBIN GLYCOSYLATED A1C: CPT | Mod: AHULAB | Performed by: HOSPITALIST

## 2024-06-01 PROCEDURE — 2500000004 HC RX 250 GENERAL PHARMACY W/ HCPCS (ALT 636 FOR OP/ED): Performed by: INTERNAL MEDICINE

## 2024-06-01 PROCEDURE — 85025 COMPLETE CBC W/AUTO DIFF WBC: CPT | Performed by: HOSPITALIST

## 2024-06-01 PROCEDURE — 93971 EXTREMITY STUDY: CPT

## 2024-06-01 PROCEDURE — 83735 ASSAY OF MAGNESIUM: CPT | Performed by: HOSPITALIST

## 2024-06-01 PROCEDURE — 1200000002 HC GENERAL ROOM WITH TELEMETRY DAILY

## 2024-06-01 PROCEDURE — 36415 COLL VENOUS BLD VENIPUNCTURE: CPT | Performed by: HOSPITALIST

## 2024-06-01 PROCEDURE — 92610 EVALUATE SWALLOWING FUNCTION: CPT | Mod: GN

## 2024-06-01 PROCEDURE — 82607 VITAMIN B-12: CPT | Mod: AHULAB | Performed by: INTERNAL MEDICINE

## 2024-06-01 PROCEDURE — 36415 COLL VENOUS BLD VENIPUNCTURE: CPT | Performed by: INTERNAL MEDICINE

## 2024-06-01 PROCEDURE — 80048 BASIC METABOLIC PNL TOTAL CA: CPT | Performed by: HOSPITALIST

## 2024-06-01 PROCEDURE — 99233 SBSQ HOSP IP/OBS HIGH 50: CPT | Performed by: INTERNAL MEDICINE

## 2024-06-01 PROCEDURE — 92526 ORAL FUNCTION THERAPY: CPT | Mod: GN

## 2024-06-01 PROCEDURE — 2500000001 HC RX 250 WO HCPCS SELF ADMINISTERED DRUGS (ALT 637 FOR MEDICARE OP): Performed by: INTERNAL MEDICINE

## 2024-06-01 PROCEDURE — 93970 EXTREMITY STUDY: CPT | Performed by: RADIOLOGY

## 2024-06-01 PROCEDURE — 93971 EXTREMITY STUDY: CPT | Performed by: RADIOLOGY

## 2024-06-01 PROCEDURE — 82947 ASSAY GLUCOSE BLOOD QUANT: CPT

## 2024-06-01 PROCEDURE — 82947 ASSAY GLUCOSE BLOOD QUANT: CPT | Mod: 91

## 2024-06-01 PROCEDURE — 85379 FIBRIN DEGRADATION QUANT: CPT | Performed by: HOSPITALIST

## 2024-06-01 RX ORDER — MAGNESIUM HYDROXIDE 2400 MG/10ML
10 SUSPENSION ORAL ONCE
Status: DISCONTINUED | OUTPATIENT
Start: 2024-06-01 | End: 2024-06-04 | Stop reason: HOSPADM

## 2024-06-01 RX ORDER — FUROSEMIDE 10 MG/ML
40 INJECTION INTRAMUSCULAR; INTRAVENOUS ONCE
Status: COMPLETED | OUTPATIENT
Start: 2024-06-01 | End: 2024-06-01

## 2024-06-01 RX ORDER — DULOXETIN HYDROCHLORIDE 20 MG/1
20 CAPSULE, DELAYED RELEASE ORAL DAILY
Status: DISCONTINUED | OUTPATIENT
Start: 2024-06-01 | End: 2024-06-04 | Stop reason: HOSPADM

## 2024-06-01 RX ADMIN — PANTOPRAZOLE SODIUM 40 MG: 40 INJECTION, POWDER, FOR SOLUTION INTRAVENOUS at 10:21

## 2024-06-01 RX ADMIN — METOPROLOL SUCCINATE 12.5 MG: 25 TABLET, EXTENDED RELEASE ORAL at 23:09

## 2024-06-01 RX ADMIN — FUROSEMIDE 40 MG: 10 INJECTION, SOLUTION INTRAMUSCULAR; INTRAVENOUS at 10:21

## 2024-06-01 RX ADMIN — ASPIRIN 81 MG: 81 TABLET, COATED ORAL at 09:47

## 2024-06-01 RX ADMIN — Medication 3 MG: at 23:10

## 2024-06-01 RX ADMIN — INSULIN LISPRO 2 UNITS: 100 INJECTION, SOLUTION INTRAVENOUS; SUBCUTANEOUS at 15:36

## 2024-06-01 RX ADMIN — SODIUM CHLORIDE 75 ML/HR: 9 INJECTION, SOLUTION INTRAVENOUS at 00:10

## 2024-06-01 ASSESSMENT — COGNITIVE AND FUNCTIONAL STATUS - GENERAL
DRESSING REGULAR UPPER BODY CLOTHING: A LOT
DRESSING REGULAR LOWER BODY CLOTHING: A LOT
DRESSING REGULAR UPPER BODY CLOTHING: A LOT
MOBILITY SCORE: 11
DAILY ACTIVITIY SCORE: 12
WALKING IN HOSPITAL ROOM: TOTAL
TOILETING: A LOT
MOVING TO AND FROM BED TO CHAIR: A LOT
TURNING FROM BACK TO SIDE WHILE IN FLAT BAD: A LOT
CLIMB 3 TO 5 STEPS WITH RAILING: TOTAL
TOILETING: A LOT
EATING MEALS: A LOT
HELP NEEDED FOR BATHING: A LOT
TURNING FROM BACK TO SIDE WHILE IN FLAT BAD: A LOT
HELP NEEDED FOR BATHING: A LOT
WALKING IN HOSPITAL ROOM: TOTAL
EATING MEALS: A LOT
MOBILITY SCORE: 9
PERSONAL GROOMING: A LOT
MOVING FROM LYING ON BACK TO SITTING ON SIDE OF FLAT BED WITH BEDRAILS: A LOT
PERSONAL GROOMING: A LOT
STANDING UP FROM CHAIR USING ARMS: A LOT
MOVING FROM LYING ON BACK TO SITTING ON SIDE OF FLAT BED WITH BEDRAILS: A LOT
DRESSING REGULAR LOWER BODY CLOTHING: A LOT
CLIMB 3 TO 5 STEPS WITH RAILING: A LOT
MOVING TO AND FROM BED TO CHAIR: A LOT
STANDING UP FROM CHAIR USING ARMS: TOTAL
DAILY ACTIVITIY SCORE: 12

## 2024-06-01 ASSESSMENT — PAIN - FUNCTIONAL ASSESSMENT
PAIN_FUNCTIONAL_ASSESSMENT: 0-10
PAIN_FUNCTIONAL_ASSESSMENT: 0-10

## 2024-06-01 ASSESSMENT — ACTIVITIES OF DAILY LIVING (ADL): LACK_OF_TRANSPORTATION: NO

## 2024-06-01 ASSESSMENT — PAIN SCALES - GENERAL
PAINLEVEL_OUTOF10: 0 - NO PAIN
PAINLEVEL_OUTOF10: 0 - NO PAIN

## 2024-06-01 NOTE — H&P
"History Of Present Illness  Maverick Tai is a 83 y.o. male with a PMH of afib (no longer on coumadin due to recent GIB), CAD, MV and TR repair, AVR, spinal stenosis, hx of GIB 2021 and 5/24, recent hospitalization at [facility] for likely GIB, presenting with generalized weakness and SOB at home.    Mr Tai had been admitted to [facility] 5/16-5/29 for GIB, Hb of 5.5, found to be iron deficient. Apparently declined endoscopy. Course complicated by pneumonia, delirium and CHF exacerbation. Was on coumadin for afib, however this was dc'd due to GIB.   Was offered hospice or rehab, but declined and was sent home with [facility].   Pt is refusing to answer questions, only wants to sleep. Tells me he may answer questions tomorrow, but not all at once.   Is able to tell me that his legs normally aren't as big as they are now.   Report given by his bedside RN was that today his [facility] provider and his wife were unable to get him out of a chair, which prompted his ED visit.     Work up in the ED shows  Slightly low Na of 131  Normal Cr  BNP 2235  HS trop 60 ->49  WBC 6.4,   Hb 10.9.   UA neg  CXR unremarkable.        Past Medical History  No past medical history on file.    Surgical History  No past surgical history on file.     Social History  He has no history on file for tobacco use, alcohol use, and drug use.    Family History  No family history on file.     Allergies  Iodine, Penicillins, Bee pollen, Cephalosporins, Cetirizine, Ciprofloxacin, Clarithromycin, Desloratadine, Diphenhydramine, Doxycycline, Gentamicin, Nickel, and Sitagliptin    Review of Systems   Unable to perform ROS: Other (Pt refusing, wants to sleep.)   Musculoskeletal:         Bilateral LE edema.         Physical Exam  Vitals reviewed.   Constitutional:       Appearance: Normal appearance.      Comments: Pt awake, but keeps his eyes closed through most questioning. Refuses to answer questions, stating he \"gives up\" and wants to sleep.   No apparent distress.   Mildly " "agitated.    HENT:      Head: Normocephalic and atraumatic.      Mouth/Throat:      Mouth: Mucous membranes are moist.   Eyes:      Extraocular Movements: Extraocular movements intact.      Conjunctiva/sclera: Conjunctivae normal.      Pupils: Pupils are equal, round, and reactive to light.   Cardiovascular:      Rate and Rhythm: Normal rate. Rhythm irregular.      Pulses: Normal pulses.      Heart sounds: Normal heart sounds.      Comments: Irregularly irregular rhythm, no murmur appreciated.   Pulmonary:      Effort: Pulmonary effort is normal.      Breath sounds: Normal breath sounds.      Comments: Breath sounds clear.   Abdominal:      General: Abdomen is flat. Bowel sounds are normal.      Palpations: Abdomen is soft.      Comments: Has a large ventral hernia around the umbilical area, is reducible.   Smaller hernia above this, approx 8cm in diameter, also reducible.   BS present, no pain on palpation.    Musculoskeletal:         General: Normal range of motion.      Right lower leg: Edema present.      Left lower leg: Edema present.      Comments: Right and left lower extremities edematous.   DP pulses palpable bilaterally.   LUE edematous     Skin:     General: Skin is warm and dry.   Neurological:      Mental Status: He is alert.      Comments: Cannot evaluate, pt refuses to answer questions, follow instructions.    Psychiatric:      Comments: Unable to assess.             Last Recorded Vitals  Blood pressure 151/82, pulse 78, temperature 36.3 °C (97.3 °F), temperature source Axillary, resp. rate 18, height 1.778 m (5' 10\"), weight 90.7 kg (200 lb), SpO2 100%.    Relevant Results        Results for orders placed or performed during the hospital encounter of 05/31/24 (from the past 24 hour(s))   CBC and Auto Differential   Result Value Ref Range    WBC 6.4 4.4 - 11.3 x10*3/uL    nRBC 0.0 0.0 - 0.0 /100 WBCs    RBC 4.40 (L) 4.50 - 5.90 x10*6/uL    Hemoglobin 10.9 (L) 13.5 - 17.5 g/dL    Hematocrit 38.0 (L) " 41.0 - 52.0 %    MCV 86 80 - 100 fL    MCH 24.8 (L) 26.0 - 34.0 pg    MCHC 28.7 (L) 32.0 - 36.0 g/dL    RDW 26.3 (H) 11.5 - 14.5 %    Platelets 165 150 - 450 x10*3/uL    Neutrophils %      Immature Granulocytes %, Automated 0.6 0.0 - 0.9 %    Lymphocytes %      Monocytes %      Eosinophils %      Basophils %      Neutrophils Absolute      Lymphocytes Absolute      Monocytes Absolute      Eosinophils Absolute      Basophils Absolute     Comprehensive metabolic panel   Result Value Ref Range    Glucose 159 (H) 74 - 99 mg/dL    Sodium 131 (L) 136 - 145 mmol/L    Potassium 3.6 3.5 - 5.3 mmol/L    Chloride 91 (L) 98 - 107 mmol/L    Bicarbonate 34 (H) 21 - 32 mmol/L    Anion Gap 10 10 - 20 mmol/L    Urea Nitrogen 26 (H) 6 - 23 mg/dL    Creatinine 0.78 0.50 - 1.30 mg/dL    eGFR 88 >60 mL/min/1.73m*2    Calcium 8.9 8.6 - 10.3 mg/dL    Albumin 3.2 (L) 3.4 - 5.0 g/dL    Alkaline Phosphatase 73 33 - 136 U/L    Total Protein 6.1 (L) 6.4 - 8.2 g/dL    AST 24 9 - 39 U/L    Bilirubin, Total 1.3 (H) 0.0 - 1.2 mg/dL    ALT 13 10 - 52 U/L   Magnesium   Result Value Ref Range    Magnesium 1.70 1.60 - 2.40 mg/dL   Protime-INR   Result Value Ref Range    Protime 14.7 (H) 9.8 - 12.8 seconds    INR 1.3 (H) 0.9 - 1.1   B-Type Natriuretic Peptide   Result Value Ref Range    BNP 2,235 (H) 0 - 99 pg/mL   Troponin I, High Sensitivity, Initial   Result Value Ref Range    Troponin I, High Sensitivity 60 (HH) 0 - 20 ng/L   Sars-CoV-2 PCR   Result Value Ref Range    Coronavirus 2019, PCR Not Detected Not Detected   ECG 12 lead   Result Value Ref Range    Ventricular Rate 85 BPM    QRS Duration 90 ms    QT Interval 352 ms    QTC Calculation(Bazett) 418 ms    R Axis 49 degrees    T Axis 66 degrees    QRS Count 14 beats    Q Onset 219 ms    T Offset 395 ms    QTC Fredericia 395 ms   Lactate   Result Value Ref Range    Lactate 1.4 0.4 - 2.0 mmol/L   Troponin, High Sensitivity, 1 Hour   Result Value Ref Range    Troponin I, High Sensitivity 49 (H) 0 -  20 ng/L   Urinalysis with Reflex Culture and Microscopic   Result Value Ref Range    Color, Urine Yellow Light-Yellow, Yellow, Dark-Yellow    Appearance, Urine Clear Clear    Specific Gravity, Urine 1.019 1.005 - 1.035    pH, Urine 5.5 5.0, 5.5, 6.0, 6.5, 7.0, 7.5, 8.0    Protein, Urine 20 (TRACE) NEGATIVE, 10 (TRACE), 20 (TRACE) mg/dL    Glucose, Urine Normal Normal mg/dL    Blood, Urine NEGATIVE NEGATIVE    Ketones, Urine NEGATIVE NEGATIVE mg/dL    Bilirubin, Urine NEGATIVE NEGATIVE    Urobilinogen, Urine Normal Normal mg/dL    Nitrite, Urine NEGATIVE NEGATIVE    Leukocyte Esterase, Urine NEGATIVE NEGATIVE   Urinalysis Microscopic   Result Value Ref Range    WBC, Urine 1-5 1-5, NONE /HPF    RBC, Urine 1-2 NONE, 1-2, 3-5 /HPF    Mucus, Urine FEW Reference range not established. /LPF    Hyaline Casts, Urine 1+ (A) NONE /LPF   POCT GLUCOSE   Result Value Ref Range    POCT Glucose 156 (H) 74 - 99 mg/dL     XR chest 1 view    Result Date: 5/31/2024  STUDY: Chest Radiograph;  5/31/2024 3:30PM INDICATION: Shortness of breath. COMPARISON: None Available. ACCESSION NUMBER(S): [id number] ORDERING CLINICIAN: STEPHY CHEEK TECHNIQUE:  Frontal chest was obtained at 16:30 hours. FINDINGS: CARDIOMEDIASTINAL SILHOUETTE: Cardiomediastinal silhouette is normal in size and configuration.  LUNGS: There is atelectasis in the right lower lobe. There are no infiltrates or effusions.  ABDOMEN: No remarkable upper abdominal findings.  BONES: No acute osseous changes.    1. No acute pulmonary pathology. 2. Atelectasis in the right lower lobe. Signed by Scottie Barboza MD    ECG 12 lead    Result Date: 5/31/2024  Atrial fibrillation Minimal voltage criteria for LVH, may be normal variant ( Arsenio product ) Septal infarct , age undetermined Abnormal ECG When compared with ECG of 08-MAR-2001 12:26, Atrial fibrillation has replaced Sinus rhythm Septal infarct is now Present T wave inversion now evident in Anterior leads        Assessment/Plan   Principal Problem:    Shortness of breath  - oxygen as needed, does not appear to wear this at home  - Documented VS in the ED show 94-98% on RA, then one value of 86% on RA,   - may benefit from evaluation for home oxygen prior to dc  - CXR unremarkable  - has significant B/L LE edema, check d-dimer for PE; check US bl LE for DVT    Bilateral LE edema  - US b/l LE to r/o DVT    Generalized weakness  - recent 2 week hospitalization at [facility], likely deconditioned  - PT/OT eval  - Wife was concerned about taking him home, likely needs placement.     Recent GIB  - monitor for dec in Hb  - dx with iron deficiency at [facility], received infusions, not dc with iron supplements  - check iron level and if low, supplement.     Atrial fibrillation  - Coumadin stopped with recent admission to [facility]  - continue telemetry  - Metoprolol for rate control    Suspected aspiration  - noted in [facility] note  - NPO  - speech therapy eval    CAD   - continue ASA, statin, BB    Code status  - Uncertain       I spent 60 minutes in the professional and overall care of this patient.      Olga William MD

## 2024-06-01 NOTE — PROGRESS NOTES
"Maverick Tai is a 83 y.o. male on day 1 of admission presenting with Shortness of breath.    Subjective   No acute events overnight. Still pretty sleepy this AM.        Objective     VITALS  Blood pressure 108/67, pulse 92, temperature 36.2 °C (97.2 °F), temperature source Axillary, resp. rate 17, height 1.778 m (5' 10\"), weight 90.7 kg (200 lb), SpO2 98%.  Physical Exam  Vitals and nursing note reviewed.   Constitutional:       General: He is not in acute distress.     Appearance: Normal appearance. He is not ill-appearing.   HENT:      Head: Normocephalic and atraumatic.      Mouth/Throat:      Mouth: Mucous membranes are moist.      Pharynx: Oropharynx is clear.   Eyes:      Extraocular Movements: Extraocular movements intact.      Conjunctiva/sclera: Conjunctivae normal.   Cardiovascular:      Rate and Rhythm: Normal rate and regular rhythm.      Heart sounds: Normal heart sounds. No murmur heard.     No friction rub. No gallop.   Pulmonary:      Effort: Pulmonary effort is normal.      Breath sounds: Rhonchi present. No wheezing or rales.   Abdominal:      General: Bowel sounds are normal. There is no distension.      Palpations: Abdomen is soft.      Tenderness: There is no abdominal tenderness. There is no guarding.   Musculoskeletal:         General: No swelling or tenderness.      Right lower leg: No edema.      Left lower leg: No edema.   Skin:     General: Skin is warm and dry.      Capillary Refill: Capillary refill takes less than 2 seconds.   Neurological:      General: No focal deficit present.      Mental Status: He is lethargic.           Intake/Output last 3 Shifts:  I/O last 3 completed shifts:  In: - (0 mL/kg)   Out: 300 (3.3 mL/kg) [Urine:300 (0.1 mL/kg/hr)]  Weight: 90.7 kg     Relevant Results  Results for orders placed or performed during the hospital encounter of 05/31/24 (from the past 24 hour(s))   CBC and Auto Differential   Result Value Ref Range    WBC 6.4 4.4 - 11.3 x10*3/uL    " nRBC 0.0 0.0 - 0.0 /100 WBCs    RBC 4.40 (L) 4.50 - 5.90 x10*6/uL    Hemoglobin 10.9 (L) 13.5 - 17.5 g/dL    Hematocrit 38.0 (L) 41.0 - 52.0 %    MCV 86 80 - 100 fL    MCH 24.8 (L) 26.0 - 34.0 pg    MCHC 28.7 (L) 32.0 - 36.0 g/dL    RDW 26.3 (H) 11.5 - 14.5 %    Platelets 165 150 - 450 x10*3/uL    Neutrophils %      Immature Granulocytes %, Automated 0.6 0.0 - 0.9 %    Lymphocytes %      Monocytes %      Eosinophils %      Basophils %      Neutrophils Absolute      Lymphocytes Absolute      Monocytes Absolute      Eosinophils Absolute      Basophils Absolute     Comprehensive metabolic panel   Result Value Ref Range    Glucose 159 (H) 74 - 99 mg/dL    Sodium 131 (L) 136 - 145 mmol/L    Potassium 3.6 3.5 - 5.3 mmol/L    Chloride 91 (L) 98 - 107 mmol/L    Bicarbonate 34 (H) 21 - 32 mmol/L    Anion Gap 10 10 - 20 mmol/L    Urea Nitrogen 26 (H) 6 - 23 mg/dL    Creatinine 0.78 0.50 - 1.30 mg/dL    eGFR 88 >60 mL/min/1.73m*2    Calcium 8.9 8.6 - 10.3 mg/dL    Albumin 3.2 (L) 3.4 - 5.0 g/dL    Alkaline Phosphatase 73 33 - 136 U/L    Total Protein 6.1 (L) 6.4 - 8.2 g/dL    AST 24 9 - 39 U/L    Bilirubin, Total 1.3 (H) 0.0 - 1.2 mg/dL    ALT 13 10 - 52 U/L   Magnesium   Result Value Ref Range    Magnesium 1.70 1.60 - 2.40 mg/dL   Protime-INR   Result Value Ref Range    Protime 14.7 (H) 9.8 - 12.8 seconds    INR 1.3 (H) 0.9 - 1.1   B-Type Natriuretic Peptide   Result Value Ref Range    BNP 2,235 (H) 0 - 99 pg/mL   Troponin I, High Sensitivity, Initial   Result Value Ref Range    Troponin I, High Sensitivity 60 (HH) 0 - 20 ng/L   Sars-CoV-2 PCR   Result Value Ref Range    Coronavirus 2019, PCR Not Detected Not Detected   Iron and TIBC   Result Value Ref Range    Iron 45 35 - 150 ug/dL    UIBC 218 110 - 370 ug/dL    TIBC 263 240 - 445 ug/dL    % Saturation 17 (L) 25 - 45 %   ECG 12 lead   Result Value Ref Range    Ventricular Rate 85 BPM    QRS Duration 90 ms    QT Interval 352 ms    QTC Calculation(Bazett) 418 ms    R Axis 49  degrees    T Axis 66 degrees    QRS Count 14 beats    Q Onset 219 ms    T Offset 395 ms    QTC Fredericia 395 ms   Lactate   Result Value Ref Range    Lactate 1.4 0.4 - 2.0 mmol/L   Troponin, High Sensitivity, 1 Hour   Result Value Ref Range    Troponin I, High Sensitivity 49 (H) 0 - 20 ng/L   Urinalysis with Reflex Culture and Microscopic   Result Value Ref Range    Color, Urine Yellow Light-Yellow, Yellow, Dark-Yellow    Appearance, Urine Clear Clear    Specific Gravity, Urine 1.019 1.005 - 1.035    pH, Urine 5.5 5.0, 5.5, 6.0, 6.5, 7.0, 7.5, 8.0    Protein, Urine 20 (TRACE) NEGATIVE, 10 (TRACE), 20 (TRACE) mg/dL    Glucose, Urine Normal Normal mg/dL    Blood, Urine NEGATIVE NEGATIVE    Ketones, Urine NEGATIVE NEGATIVE mg/dL    Bilirubin, Urine NEGATIVE NEGATIVE    Urobilinogen, Urine Normal Normal mg/dL    Nitrite, Urine NEGATIVE NEGATIVE    Leukocyte Esterase, Urine NEGATIVE NEGATIVE   Extra Urine Gray Tube   Result Value Ref Range    Extra Tube Hold for add-ons.    Urinalysis Microscopic   Result Value Ref Range    WBC, Urine 1-5 1-5, NONE /HPF    RBC, Urine 1-2 NONE, 1-2, 3-5 /HPF    Mucus, Urine FEW Reference range not established. /LPF    Hyaline Casts, Urine 1+ (A) NONE /LPF   TSH   Result Value Ref Range    Thyroid Stimulating Hormone 6.06 (H) 0.44 - 3.98 mIU/L   POCT GLUCOSE   Result Value Ref Range    POCT Glucose 156 (H) 74 - 99 mg/dL   D-dimer, VTE Exclusion   Result Value Ref Range    D-Dimer, Quantitative VTE Exclusion 1,560 (H) <=500 ng/mL FEU   POCT GLUCOSE   Result Value Ref Range    POCT Glucose 136 (H) 74 - 99 mg/dL   CBC and Auto Differential   Result Value Ref Range    WBC 6.4 4.4 - 11.3 x10*3/uL    nRBC 0.0 0.0 - 0.0 /100 WBCs    RBC 4.04 (L) 4.50 - 5.90 x10*6/uL    Hemoglobin 10.0 (L) 13.5 - 17.5 g/dL    Hematocrit 34.7 (L) 41.0 - 52.0 %    MCV 86 80 - 100 fL    MCH 24.8 (L) 26.0 - 34.0 pg    MCHC 28.8 (L) 32.0 - 36.0 g/dL    RDW 25.7 (H) 11.5 - 14.5 %    Platelets 155 150 - 450 x10*3/uL     Neutrophils % 83.9 40.0 - 80.0 %    Immature Granulocytes %, Automated 0.6 0.0 - 0.9 %    Lymphocytes % 6.6 13.0 - 44.0 %    Monocytes % 4.1 2.0 - 10.0 %    Eosinophils % 4.2 0.0 - 6.0 %    Basophils % 0.6 0.0 - 2.0 %    Neutrophils Absolute 5.33 1.60 - 5.50 x10*3/uL    Immature Granulocytes Absolute, Automated 0.04 0.00 - 0.50 x10*3/uL    Lymphocytes Absolute 0.42 (L) 0.80 - 3.00 x10*3/uL    Monocytes Absolute 0.26 0.05 - 0.80 x10*3/uL    Eosinophils Absolute 0.27 0.00 - 0.40 x10*3/uL    Basophils Absolute 0.04 0.00 - 0.10 x10*3/uL   Basic metabolic panel   Result Value Ref Range    Glucose 139 (H) 74 - 99 mg/dL    Sodium 135 (L) 136 - 145 mmol/L    Potassium 3.6 3.5 - 5.3 mmol/L    Chloride 94 (L) 98 - 107 mmol/L    Bicarbonate 37 (H) 21 - 32 mmol/L    Anion Gap 8 (L) 10 - 20 mmol/L    Urea Nitrogen 24 (H) 6 - 23 mg/dL    Creatinine 0.79 0.50 - 1.30 mg/dL    eGFR 88 >60 mL/min/1.73m*2    Calcium 9.0 8.6 - 10.3 mg/dL   Magnesium   Result Value Ref Range    Magnesium 1.90 1.60 - 2.40 mg/dL   Morphology   Result Value Ref Range    RBC Morphology See Below     Polychromasia Mild     Hypochromia Mild    POCT GLUCOSE   Result Value Ref Range    POCT Glucose 124 (H) 74 - 99 mg/dL       Imaging Results  Vascular US upper extremity venous duplex left   Final Result   Negative study.  No thrombus in the central veins of the  left upper   extremities.        MACRO:   None        Signed by: Anson Pa 6/1/2024 5:10 AM   Dictation workstation:   [id number]      Lower extremity venous duplex bilateral   Final Result   Negative study.  No deep venous thrombosis of the  bilateral lower   extremity.        MACRO:   None        Signed by: Anson Pa 6/1/2024 5:17 AM   Dictation workstation:   [id number]      XR chest 1 view   Final Result   1. No acute pulmonary pathology.   2. Atelectasis in the right lower lobe.   Signed by Scottie Barboza MD          Medications:  aspirin, 81 mg, oral, Daily  calcium  carbonate-vitamin D3, 1 tablet, oral, Daily  DULoxetine, 20 mg, oral, Daily  insulin glargine, 3 Units, subcutaneous, q24h  insulin lispro, 0-10 Units, subcutaneous, q4h  levothyroxine, 75 mcg, oral, Daily  melatonin, 3 mg, oral, Daily  metoprolol succinate XL, 12.5 mg, oral, BID  pantoprazole, 40 mg, intravenous, Daily  polyethylene glycol, 17 g, oral, Daily  tamsulosin, 0.4 mg, oral, Daily       PRN medications: acetaminophen, acetaminophen, dextrose, dextrose, glucagon, glucagon, ondansetron ODT **OR** ondansetron        Assessment/Plan   Principal Problem:    Shortness of breath    Shortness of breath  - oxygen as needed, does not appear to wear this at home  - Documented VS in the ED show 94-98% on RA, then one value of 86% on RA,   - may benefit from evaluation for home oxygen prior to dc  - CXR unremarkable  - has significant B/L LE edema, check d-dimer for PE; check US bl LE for DVT     Bilateral LE edema  - US b/l LE neg  -Will try Lasix 40mg IV today      Generalized weakness  - recent 2 week hospitalization at [facility], likely deconditioned  - PT/OT eval  - Wife was concerned about taking him home, likely needs placement.      Recent GIB  - monitor for dec in Hb  - dx with iron deficiency at [facility], received infusions, not dc with iron supplements  - check iron level and if low, supplement.      Atrial fibrillation  - Coumadin stopped with recent admission to [facility]  - continue telemetry  - Metoprolol for rate control     Suspected aspiration  - noted in [facility] note  - NPO  - speech therapy eval     CAD   - continue ASA, statin, BB    Constipation  -CXR with large amount of stool in colon upon my review of the imagine.   -Milk of Mag  -May also be contributing to his SOB.     DVT Prophylaxis:  NA Recent GIB     Disposition  Appears wife is still refusing placement. Will see if we can not wean him off the O2.     Ander Palumbo, DO [facility]

## 2024-06-01 NOTE — PROGRESS NOTES
"   06/01/24 0834   Discharge Planning   Living Arrangements Spouse/significant other   Support Systems Spouse/significant other   Type of Residence Private residence   Home or Post Acute Services In home services   Type of Home Care Services Home PT;Home OT   Patient expects to be discharged to: Spoke to wife, patient confused. patient was dc from [facility] on 5/29. He had been rec for SNF on that admission. and wife declined. She still insisting on him going home. She also hurt her hip; which will make it even more difficult to care for him. Prior to [facility] admit, \"he was fine\", and walking. Will leave a SNF list in room   Does the patient need discharge transport arranged? Yes   RoundTrip coordination needed? Yes   Has discharge transport been arranged? No   Financial Resource Strain   How hard is it for you to pay for the very basics like food, housing, medical care, and heating? Not hard   Housing Stability   In the last 12 months, was there a time when you were not able to pay the mortgage or rent on time? N   In the last 12 months, how many places have you lived? 1   In the last 12 months, was there a time when you did not have a steady place to sleep or slept in a shelter (including now)? N   Transportation Needs   In the past 12 months, has lack of transportation kept you from medical appointments or from getting medications? no   In the past 12 months, has lack of transportation kept you from meetings, work, or from getting things needed for daily living? No   Patient Choice   Patient / Family choosing to utilize agency / facility established prior to hospitalization Terrie Tai is a 83 y.o. male on day 1 of admission presenting with Shortness of breath.    Cecy Fontanez RN      "

## 2024-06-01 NOTE — PROGRESS NOTES
Speech-Language Pathology    SLP Adult Inpatient Speech-Language Pathology Clinical Swallow Evaluation    Patient Name: Maverick Tai  MRN: [id number]  Today's Date: 6/1/2024             Current Problem:   1. Shortness of breath        2. Failure to thrive in adult        3. Lower extremity edema  Lower extremity venous duplex bilateral    Lower extremity venous duplex bilateral      4. Edema of left upper extremity  Vascular US upper extremity venous duplex left    Vascular US upper extremity venous duplex left            Recommendations:  Risk for Aspiration: Yes  Additional Recommendations: Modified barium swallow study, Dysphagia treatment  Solid Diet Recommendations : NPO  Liquid Diet Recommendations: NPO  Dysphagia Goals: Patient will demonstrate appropriate strategies for swallowing safety (MBS study)      Assessment:  Pt given 2 bites of puree via tsp. 1/2 tsp size - mildly delayed AP transfer, laryngeal bobbing noted. Vocal quality clear, but strong coughing noted after the swallow of 2nd bite. Trial stopped due to overt s/s, discussed results of bedside assessment with nursing.   Will recommend MBS to determine safety with PO.      Plan:  Inpatient/Swing Bed or Outpatient: Inpatient  Treatment/Interventions: Bolus trials, Assess diet tolerance, Pharyngeal exercises, Oral motor exercises, Patient/family education, Complete MBSS  SLP Plan: Skilled SLP  SLP Frequency: 3x per week  Duration: Current admission  SLP Discharge Recommendations: Continue skilled SLP services at the next level of care  Diet Recommendations: Solid, Liquid  Solid Consistency: NPO  Liquid Consistency: NPO  Discussed POC: Patient, Nursing  Patient/Caregiver Agreeable: Yes  SLP - OK to Discharge: Yes      Subjective   Pt in bed, in upright position. Dry oral cavity, open mouth posture at rest. Pt pleasant and agreeable to swallow assessment, currently NPO.       General Visit Information:  Patient Class: Inpatient  Reason for Referral:  "swallow eval- suspected oral/pharyngeal dysphagia, r/o aspiration, determine if MBS is needed  Past Medical History Relevant to Rehab: hortness of Breath      HPI: Maverick Tai is a 83 y.o. 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  Patient is confused, states he does not know if he is short of breath or not.  He is perseverating on needing to urinate before answering any questions.  He denies any pain currently.  He cannot tell me where he lives, states \"I believe I am in an extended area of swollen or nearby.\"  Admits to feeling tired.  Per recent DC summary from 5/29 from [facility], patient presented with severe anemia, change his mind multiple times about whether to proceed with endoscopic evaluation, they were considering hospice but decided to hold off  Developmental Status: Age Appropriate  Patient Seen During This Visit: Yes  Prior to Session Communication: Bedside nurse  Reviewed Procedures and Risks: Yes  Current Diet : NPO  Dysphagia Diagnosis: Mild oral stage dysphagia, Moderate pharyngeal stage dysphagia      Baseline Assessment:  Behavior/Cognition: Alert, Cooperative  Hearing: Within Functional Limits  Patient Positioning: Upright in Bed  Baseline Vocal Quality: Normal  Volitional Cough: Strong  Volitional Swallow: Delayed      Pain:  Pain Assessment: 0-10  Pain Score: 0 - No pain       Oral/Motor Assessment:  Oral Hygiene: Dry- open mouth posture  Dentition: " Adequate/Natural  Oral Motor: Within Functional Limits  Vocal Quality: Within Functional Limits  Intelligibility: Intelligible  Breath Support: Adequate for speech  Hearing: Within Functional Limits      Consistencies Trialed:  Consistencies Trialed: Yes  Consistencies Trialed: Pureed/extremely thick (IDDSI Level 4)      Clinical Observations:  Patient Positioning: Upright in Bed  Management of Oral Secretions: Adequate  Signs/Symptoms of Aspiration: Cough  Other Signs/Symptoms of Difficulty with Feeding: Oral Holding  Overt Signs or Symptoms of Aspiration: Pureed/Extremely Thick (IDDSI Level 4)  Prolonged Oral Manipulation: Pureed/Extremity Thick (IDDSI Level 4)  Multiple Swallows: Pureed/Extremely Thick (IDDSI Level 4)  Reduced Laryngeal Movement Upon Palpation: Pureed/Extremely Thick (IDDSI Level 4)  Clinical Observation Comment: Pt given 2 bites of puree via tsp. 1/2 tsp size - mildly delayed AP transfer, laryngeal bobbing noted. Vocal quality clear, but strong coughing noted after the swallow of 2nd bite. Trial stopped, discussed with nursing. Will recommend MBS to determine safety with PO.  Treatment Provided: Yes   Treatment Tolerance: Patient limited by fatigue  Medical Staff Made Aware: Yes  Strengths: Cognition  Barriers: Comorbidities      Goals:  Pt will complete MBS study, in order to determine safety with PO items of various consistencies.    Pt will perform OM and pharyngeal ex's, using ice chips, with SLP only, in order to strengthen musculature in prep for PO.        Consultations/Referrals/Coordination of Services:   Recommend MBS study

## 2024-06-02 LAB
ANION GAP SERPL CALC-SCNC: 9 MMOL/L (ref 10–20)
BUN SERPL-MCNC: 21 MG/DL (ref 6–23)
CALCIUM SERPL-MCNC: 8.7 MG/DL (ref 8.6–10.3)
CHLORIDE SERPL-SCNC: 94 MMOL/L (ref 98–107)
CO2 SERPL-SCNC: 35 MMOL/L (ref 21–32)
CREAT SERPL-MCNC: 0.84 MG/DL (ref 0.5–1.3)
EGFRCR SERPLBLD CKD-EPI 2021: 86 ML/MIN/1.73M*2
ERYTHROCYTE [DISTWIDTH] IN BLOOD BY AUTOMATED COUNT: 25.9 % (ref 11.5–14.5)
GLUCOSE BLD MANUAL STRIP-MCNC: 122 MG/DL (ref 74–99)
GLUCOSE BLD MANUAL STRIP-MCNC: 135 MG/DL (ref 74–99)
GLUCOSE BLD MANUAL STRIP-MCNC: 158 MG/DL (ref 74–99)
GLUCOSE BLD MANUAL STRIP-MCNC: 165 MG/DL (ref 74–99)
GLUCOSE BLD MANUAL STRIP-MCNC: 167 MG/DL (ref 74–99)
GLUCOSE BLD MANUAL STRIP-MCNC: 216 MG/DL (ref 74–99)
GLUCOSE SERPL-MCNC: 145 MG/DL (ref 74–99)
HCT VFR BLD AUTO: 35.7 % (ref 41–52)
HGB BLD-MCNC: 10.4 G/DL (ref 13.5–17.5)
MCH RBC QN AUTO: 25.5 PG (ref 26–34)
MCHC RBC AUTO-ENTMCNC: 29.1 G/DL (ref 32–36)
MCV RBC AUTO: 88 FL (ref 80–100)
NRBC BLD-RTO: 0 /100 WBCS (ref 0–0)
PLATELET # BLD AUTO: 168 X10*3/UL (ref 150–450)
POTASSIUM SERPL-SCNC: 3.9 MMOL/L (ref 3.5–5.3)
RBC # BLD AUTO: 4.08 X10*6/UL (ref 4.5–5.9)
SODIUM SERPL-SCNC: 134 MMOL/L (ref 136–145)
WBC # BLD AUTO: 6.1 X10*3/UL (ref 4.4–11.3)

## 2024-06-02 PROCEDURE — 99233 SBSQ HOSP IP/OBS HIGH 50: CPT | Performed by: INTERNAL MEDICINE

## 2024-06-02 PROCEDURE — 2500000004 HC RX 250 GENERAL PHARMACY W/ HCPCS (ALT 636 FOR OP/ED): Performed by: INTERNAL MEDICINE

## 2024-06-02 PROCEDURE — 2500000005 HC RX 250 GENERAL PHARMACY W/O HCPCS: Performed by: INTERNAL MEDICINE

## 2024-06-02 PROCEDURE — 85027 COMPLETE CBC AUTOMATED: CPT | Performed by: INTERNAL MEDICINE

## 2024-06-02 PROCEDURE — 2500000001 HC RX 250 WO HCPCS SELF ADMINISTERED DRUGS (ALT 637 FOR MEDICARE OP): Performed by: INTERNAL MEDICINE

## 2024-06-02 PROCEDURE — 80048 BASIC METABOLIC PNL TOTAL CA: CPT | Performed by: INTERNAL MEDICINE

## 2024-06-02 PROCEDURE — 2500000001 HC RX 250 WO HCPCS SELF ADMINISTERED DRUGS (ALT 637 FOR MEDICARE OP): Performed by: HOSPITALIST

## 2024-06-02 PROCEDURE — 2500000002 HC RX 250 W HCPCS SELF ADMINISTERED DRUGS (ALT 637 FOR MEDICARE OP, ALT 636 FOR OP/ED): Performed by: HOSPITALIST

## 2024-06-02 PROCEDURE — 97161 PT EVAL LOW COMPLEX 20 MIN: CPT | Mod: GP

## 2024-06-02 PROCEDURE — 82947 ASSAY GLUCOSE BLOOD QUANT: CPT | Mod: 91,MUE

## 2024-06-02 PROCEDURE — 2500000004 HC RX 250 GENERAL PHARMACY W/ HCPCS (ALT 636 FOR OP/ED): Performed by: HOSPITALIST

## 2024-06-02 PROCEDURE — 1200000002 HC GENERAL ROOM WITH TELEMETRY DAILY

## 2024-06-02 PROCEDURE — 36415 COLL VENOUS BLD VENIPUNCTURE: CPT | Performed by: INTERNAL MEDICINE

## 2024-06-02 PROCEDURE — C9113 INJ PANTOPRAZOLE SODIUM, VIA: HCPCS | Performed by: HOSPITALIST

## 2024-06-02 RX ORDER — MAGNESIUM HYDROXIDE 2400 MG/10ML
10 SUSPENSION ORAL ONCE
Status: COMPLETED | OUTPATIENT
Start: 2024-06-02 | End: 2024-06-02

## 2024-06-02 RX ORDER — FUROSEMIDE 10 MG/ML
40 INJECTION INTRAMUSCULAR; INTRAVENOUS ONCE
Status: COMPLETED | OUTPATIENT
Start: 2024-06-02 | End: 2024-06-02

## 2024-06-02 RX ADMIN — INSULIN LISPRO 4 UNITS: 100 INJECTION, SOLUTION INTRAVENOUS; SUBCUTANEOUS at 12:34

## 2024-06-02 RX ADMIN — LEVOTHYROXINE SODIUM 75 MCG: 75 TABLET ORAL at 05:50

## 2024-06-02 RX ADMIN — SODIUM CHLORIDE 500 ML: 9 INJECTION, SOLUTION INTRAVENOUS at 20:57

## 2024-06-02 RX ADMIN — INSULIN LISPRO 2 UNITS: 100 INJECTION, SOLUTION INTRAVENOUS; SUBCUTANEOUS at 18:22

## 2024-06-02 RX ADMIN — CARBOXYMETHYLCELLULOSE SODIUM 1 DROP: 0.5 SOLUTION/ DROPS OPHTHALMIC at 10:50

## 2024-06-02 RX ADMIN — METOPROLOL SUCCINATE 12.5 MG: 25 TABLET, EXTENDED RELEASE ORAL at 12:33

## 2024-06-02 RX ADMIN — FUROSEMIDE 40 MG: 10 INJECTION, SOLUTION INTRAMUSCULAR; INTRAVENOUS at 10:50

## 2024-06-02 RX ADMIN — Medication 3 MG: at 22:29

## 2024-06-02 RX ADMIN — Medication 1 TABLET: at 11:07

## 2024-06-02 RX ADMIN — TAMSULOSIN HYDROCHLORIDE 0.4 MG: 0.4 CAPSULE ORAL at 11:07

## 2024-06-02 RX ADMIN — MAGNESIUM HYDROXIDE 10 ML: 2400 SUSPENSION ORAL at 11:07

## 2024-06-02 RX ADMIN — POLYETHYLENE GLYCOL 3350 17 G: 17 POWDER, FOR SOLUTION ORAL at 11:07

## 2024-06-02 RX ADMIN — ASPIRIN 81 MG: 81 TABLET, COATED ORAL at 11:07

## 2024-06-02 RX ADMIN — DULOXETINE HYDROCHLORIDE 20 MG: 20 CAPSULE, DELAYED RELEASE ORAL at 11:07

## 2024-06-02 RX ADMIN — PANTOPRAZOLE SODIUM 40 MG: 40 INJECTION, POWDER, FOR SOLUTION INTRAVENOUS at 10:50

## 2024-06-02 ASSESSMENT — COGNITIVE AND FUNCTIONAL STATUS - GENERAL
EATING MEALS: A LITTLE
STANDING UP FROM CHAIR USING ARMS: A LOT
MOVING FROM LYING ON BACK TO SITTING ON SIDE OF FLAT BED WITH BEDRAILS: A LOT
CLIMB 3 TO 5 STEPS WITH RAILING: TOTAL
MOVING TO AND FROM BED TO CHAIR: TOTAL
DRESSING REGULAR UPPER BODY CLOTHING: A LOT
TURNING FROM BACK TO SIDE WHILE IN FLAT BAD: A LOT
DAILY ACTIVITIY SCORE: 13
MOVING FROM LYING ON BACK TO SITTING ON SIDE OF FLAT BED WITH BEDRAILS: A LOT
TURNING FROM BACK TO SIDE WHILE IN FLAT BAD: A LOT
WALKING IN HOSPITAL ROOM: TOTAL
DRESSING REGULAR LOWER BODY CLOTHING: A LOT
MOBILITY SCORE: 10
HELP NEEDED FOR BATHING: A LOT
MOBILITY SCORE: 9
STANDING UP FROM CHAIR USING ARMS: TOTAL
TURNING FROM BACK TO SIDE WHILE IN FLAT BAD: A LOT
MOBILITY SCORE: 8
MOVING FROM LYING ON BACK TO SITTING ON SIDE OF FLAT BED WITH BEDRAILS: A LOT
CLIMB 3 TO 5 STEPS WITH RAILING: TOTAL
PERSONAL GROOMING: A LOT
STANDING UP FROM CHAIR USING ARMS: TOTAL
MOVING TO AND FROM BED TO CHAIR: A LOT
TOILETING: A LOT
MOVING TO AND FROM BED TO CHAIR: A LOT
WALKING IN HOSPITAL ROOM: TOTAL
WALKING IN HOSPITAL ROOM: TOTAL
CLIMB 3 TO 5 STEPS WITH RAILING: TOTAL

## 2024-06-02 ASSESSMENT — PAIN - FUNCTIONAL ASSESSMENT: PAIN_FUNCTIONAL_ASSESSMENT: 0-10

## 2024-06-02 ASSESSMENT — PAIN SCALES - GENERAL
PAINLEVEL_OUTOF10: 0 - NO PAIN
PAINLEVEL_OUTOF10: 0 - NO PAIN

## 2024-06-02 NOTE — PROGRESS NOTES
Physical Therapy    Physical Therapy Evaluation    Patient Name: Maverick Tai  MRN: [id number]  Today's Date: 6/2/2024   Time Calculation  Start Time: 1250  Stop Time: 1311  Time Calculation (min): 21 min    Assessment/Plan   PT Assessment  PT Assessment Results: Decreased strength, Decreased endurance, Impaired balance, Decreased mobility, Decreased cognition  Rehab Prognosis: Good  Barriers to Discharge: None identified  Evaluation/Treatment Tolerance: Patient limited by fatigue  Medical Staff Made Aware: Yes  Strengths: Support of Caregivers  Barriers to Participation: Ability to acquire knowledge, Insight into problems  End of Session Communication: Bedside nurse  Assessment Comment: Pt presents today with decreased BLE strength, balance, and activity tolerance. Pt is currently below the reported PLOF being unable to tolerate standing transfers or short distance ambulation. Pt would benefit from continued PT to address the above factors and bring the pt closer to the PLOF while reducing fall risk.  End of Session Patient Position: Bed, 4 rail up, Alarm on (Rn aware)  IP OR SWING BED PT PLAN  Inpatient or Swing Bed: Inpatient  PT Plan  Treatment/Interventions: Bed mobility, Transfer training, Gait training, Balance training, Strengthening, Endurance training, Therapeutic exercise, Therapeutic activity, Home exercise program, Postural re-education  PT Plan: Skilled PT  PT Frequency: 3 times per week  PT Discharge Recommendations: Moderate intensity level of continued care  PT Recommended Transfer Status: Assist x2, Total assist  PT - OK to Discharge: Yes (Per PT POC)    Subjective   General Visit Information:  General  Reason for Referral: 82 y/o M presenting with generalized weakness and shortness of breath  Referred By: BRANDY William  Past Medical History Relevant to Rehab: A-fib, CAD, MV and TR repair, AVR, spinal stenosis, GIB  Family/Caregiver Present: Yes  Caregiver Feedback: Caregiver present and assists pt  with subjective portion of evaluation. Caregiver exits room before start of mobility  Prior to Session Communication: Bedside nurse  Patient Position Received: Bed, 4 rail up, Alarm on  Preferred Learning Style: auditory, kinesthetic, visual  General Comment: Pt supine in bed upon PT arrival. Cleared to participate with RN, and agreeable to PT evaluation.  Home Living:  Home Living  Type of Home: Condo  Lives With: Spouse  Home Adaptive Equipment: Walker rolling or standard, Hospital bed, Wheelchair-manual (lift chair)  Home Layout: One level  Home Access: Ramped entrance  Bathroom Toilet: Other (Comment) (Bedside commode)  Bathroom Equipment: Bedside commode  Prior Level of Function:  Prior Function Per Pt/Caregiver Report  Level of [place]: Needs assistance with ADLs, Needs assistance with homemaking, Needs assistance with functional transfers  Receives Help From: Family, Personal care attendant (Caregiver reports pt has daily home aides 5 hrs a day. Pt's wife assists pt the rest of the day)  Bath:  (Caregiver reports pt requires assist with bathing)  Dressing:  (Per [place], pt IND with dressing)  Homemaking Assistance: Needs assistance  Meal Prep: Total  Laundry: Total  Driving/Transportation: Total  Ambulatory Assistance: Needs assistance  Gait:  (Caregiver reports short distance household ambulation with walker with assist or via WC)  Precautions:  Precautions  Medical Precautions: Fall precautions, Oxygen therapy device and L/min    Objective   Pain:  Pain Assessment  Pain Assessment: 0-10  Pain Score: 0 - No pain  Cognition:  Cognition  Overall Cognitive Status: Impaired  Orientation Level:  (Oriented to name only)  Sustained Attention: Impaired  Short-Term Memory: Impaired  Insight: Moderate  Impulsive: Mildly    General Assessments:  Activity Tolerance  Endurance: Decreased tolerance for upright activites    Coordination  Movements are Fluid and Coordinated: Yes    Postural Control  Postural  Control: Impaired  Posture Comment: Rounded shoulders in sitting EOB    Static Sitting Balance  Static Sitting-Balance Support: Bilateral upper extremity supported  Static Sitting-Level of Assistance: Contact guard  Static Sitting-Comment/Number of Minutes: About 2 minutes  Dynamic Sitting Balance  Dynamic Sitting-Balance Support: Bilateral upper extremity supported  Dynamic Sitting-Comments: CGA during LE MMT    Static Standing Balance  Static Standing-Comment/Number of Minutes: Pt declines further activity after EOB sitting opting to return to supine  Dynamic Standing Balance  Dynamic Standing-Comments: See static standing balance comments  Functional Assessments:  Bed Mobility  Bed Mobility: Yes  Bed Mobility 1  Bed Mobility 1: Supine to sitting  Level of Assistance 1: Moderate assistance  Bed Mobility Comments 1: Assist with trunk into upright sitting. Pt able to progress BLE off the EOB with HOB elevated.  Bed Mobility 2  Bed Mobility  2: Sitting to supine  Level of Assistance 2: Minimum assistance  Bed Mobility Comments 2: Assist with trunk control and positioning into supine. Pt able to lift BLE into bed wtih HOB lowered  Bed Mobility 3  Bed Mobility 3: Rolling left  Level of Assistance 3: Moderate assistance  Bed Mobility Comments 3: Pt able to participate with RUFINO brown on bed rail to maintain sidelying position  Bed Mobility 4  Bed Mobility 4: Scooting  Level of Assistance 4: Dependent, +2  Bed Mobility Comments 4: Dependent 2-person scoot toward the HOb via draw sheet for better positioning in supine    Transfers  Transfer: No (See static standing balance comments)    Ambulation/Gait Training  Ambulation/Gait Training Performed: No (See static standing balance comments)    Stairs  Stairs: No  Extremity/Trunk Assessments:  RLE   RLE : Exceptions to WFL  Strength RLE  R Hip Flexion: 3-/5  R Knee Extension: 3+/5  R Ankle Dorsiflexion: 3+/5  R Ankle Plantar Flexion: 3+/5  LLE   LLE : Exceptions to WFL  Strength  LLE  L Hip Flexion: 3-/5  L Knee Extension: 3+/5  L Ankle Dorsiflexion: 3+/5  L Ankle Plantar Flexion: 3+/5  Outcome Measures:  [facility] Basic Mobility  Turning from your back to your side while in a flat bed without using bedrails: A lot  Moving from lying on your back to sitting on the side of a flat bed without using bedrails: A lot  Moving to and from bed to chair (including a wheelchair): A lot  Standing up from a chair using your arms (e.g. wheelchair or bedside chair): Total  To walk in hospital room: Total  Climbing 3-5 steps with railing: Total  Basic Mobility - Total Score: 9    Encounter Problems       Encounter Problems (Active)       Balance       Goal 1 (Progressing)       Start:  06/02/24    Expected End:  06/16/24       Pt performs all sitting balance with supervision and standing balance with mod assist x 1 using LRAD            Mobility       STG - Patient will ambulate (Not Progressing)       Start:  06/02/24    Expected End:  06/16/24       10 ft with mod assist x 2 using LRAD            PT Problem       PT Goal 1 (Not Progressing)       Start:  06/02/24    Expected End:  06/16/24       Pt performs 2 sets of 12 prescribed BLE HEP with assist as needed            PT Transfers       STG - Patient to transfer to and from sit to supine (Progressing)       Start:  06/02/24    Expected End:  06/16/24       With min assist         STG - Patient will transfer sit to and from stand (Not Progressing)       Start:  06/02/24    Expected End:  06/16/24       With mod assist x 2 using LRAD              Education Documentation  Body Mechanics, taught by Colin Chandler PT at 6/2/2024  2:28 PM.  Learner: Patient  Readiness: Acceptance  Method: Explanation, Demonstration  Response: Needs Reinforcement    Mobility Training, taught by Colin Chandler PT at 6/2/2024  2:28 PM.  Learner: Patient  Readiness: Acceptance  Method: Explanation, Demonstration  Response: Needs Reinforcement    Education Comments  No  comments found.

## 2024-06-02 NOTE — CARE PLAN
Problem: Pain  Goal: My pain/discomfort is manageable  Outcome: Progressing     Problem: Daily Care  Goal: Daily care needs are met  Outcome: Progressing     Problem: Psychosocial Needs  Goal: Demonstrates ability to cope with hospitalization/illness  Outcome: Progressing  Goal: Collaborate with me, my family, and caregiver to identify my specific goals  Outcome: Progressing     Problem: Discharge Barriers  Goal: My discharge needs are met  Outcome: Progressing     Problem: Fall/Injury  Goal: Not fall by end of shift  Outcome: Progressing  Goal: Be free from injury by end of the shift  Outcome: Progressing

## 2024-06-02 NOTE — NURSING NOTE
"Upon arrival at the patients bedside the patient is AO+3, vss, very labile, participated in head to toe assessment, refused q2 turns, yelled out into the rangel intermittently. The patient has to be coaxed to take his medications. The patient remained hemodynamically stable no noted distress, rounding ensued care transferred with the patient yelling into the hallway\" I want to eat\". The patient has some noted coughing after eating food or liquid. The patient remained free of distress rounding ensued care transferred without incidence.  "

## 2024-06-02 NOTE — CARE PLAN
Problem: Pain  Goal: My pain/discomfort is manageable  Outcome: Progressing     Problem: Daily Care  Goal: Daily care needs are met  Outcome: Progressing     Problem: Psychosocial Needs  Goal: Demonstrates ability to cope with hospitalization/illness  Outcome: Progressing  Goal: Collaborate with me, my family, and caregiver to identify my specific goals  Outcome: Progressing     Problem: Discharge Barriers  Goal: My discharge needs are met  Outcome: Progressing   The patient's goals for the shift include

## 2024-06-02 NOTE — PROGRESS NOTES
"SW called and spoke with Pt's wife. Pt and wife live at home. [facility] was ordered after [facility] hospitalization to provide nursing and therapy however they did not come out because Pt went back to hospital. Two weeks ago, wife states pt was ambulating with a walker. Wife states Pt has been getting weaker and weaker. DME at home Wheelchair, speciality bed, walker, and BSC. Wife states they have Long term care insurance and have someone come out 5 hours a day. They sit with Pt to keep him from falling and help with meals. Wife also states pt should have home oxygen upon discharge. Per wife, plan is for Pt to return home  with HHC \"he is not going to a skilled nursing facility\".   "

## 2024-06-02 NOTE — PROGRESS NOTES
"Maverick Tai is a 83 y.o. male on day 2 of admission presenting with Shortness of breath.    Subjective   Much more alert today. Had long conversation with patients wife yesterday. States he has had a general deterioration over the last couple of months with several hospital stays. Does not want SNF, understands that he is not very mobile and just lays in bed most of the time.        Objective     VITALS  Blood pressure (!) 104/41, pulse 88, temperature 36.5 °C (97.7 °F), temperature source Temporal, resp. rate 18, height 1.778 m (5' 10\"), weight 90.7 kg (200 lb), SpO2 100%.  Physical Exam  Vitals and nursing note reviewed.   Constitutional:       General: He is not in acute distress.     Appearance: Normal appearance. He is not ill-appearing.   HENT:      Head: Normocephalic and atraumatic.      Mouth/Throat:      Mouth: Mucous membranes are moist.      Pharynx: Oropharynx is clear.   Eyes:      Extraocular Movements: Extraocular movements intact.      Conjunctiva/sclera: Conjunctivae normal.   Cardiovascular:      Rate and Rhythm: Normal rate and regular rhythm.      Heart sounds: Normal heart sounds. No murmur heard.     No friction rub. No gallop.   Pulmonary:      Effort: Pulmonary effort is normal.      Breath sounds: No wheezing or rales.   Abdominal:      General: Bowel sounds are normal. There is no distension.      Palpations: Abdomen is soft.      Tenderness: There is no abdominal tenderness. There is no guarding.   Musculoskeletal:         General: No swelling or tenderness.      Right lower leg: No edema.      Left lower leg: No edema.   Skin:     General: Skin is warm and dry.      Capillary Refill: Capillary refill takes less than 2 seconds.   Neurological:      General: No focal deficit present.      Mental Status: He is easily aroused.           Intake/Output last 3 Shifts:  I/O last 3 completed shifts:  In: - (0 mL/kg)   Out: 500 (5.5 mL/kg) [Urine:500 (0.2 mL/kg/hr)]  Weight: 90.7 kg "     Relevant Results  Results for orders placed or performed during the hospital encounter of 05/31/24 (from the past 24 hour(s))   POCT GLUCOSE   Result Value Ref Range    POCT Glucose 117 (H) 74 - 99 mg/dL   POCT GLUCOSE   Result Value Ref Range    POCT Glucose 183 (H) 74 - 99 mg/dL   POCT GLUCOSE   Result Value Ref Range    POCT Glucose 135 (H) 74 - 99 mg/dL   CBC   Result Value Ref Range    WBC 6.1 4.4 - 11.3 x10*3/uL    nRBC 0.0 0.0 - 0.0 /100 WBCs    RBC 4.08 (L) 4.50 - 5.90 x10*6/uL    Hemoglobin 10.4 (L) 13.5 - 17.5 g/dL    Hematocrit 35.7 (L) 41.0 - 52.0 %    MCV 88 80 - 100 fL    MCH 25.5 (L) 26.0 - 34.0 pg    MCHC 29.1 (L) 32.0 - 36.0 g/dL    RDW 25.9 (H) 11.5 - 14.5 %    Platelets 168 150 - 450 x10*3/uL   Basic Metabolic Panel   Result Value Ref Range    Glucose 145 (H) 74 - 99 mg/dL    Sodium 134 (L) 136 - 145 mmol/L    Potassium 3.9 3.5 - 5.3 mmol/L    Chloride 94 (L) 98 - 107 mmol/L    Bicarbonate 35 (H) 21 - 32 mmol/L    Anion Gap 9 (L) 10 - 20 mmol/L    Urea Nitrogen 21 6 - 23 mg/dL    Creatinine 0.84 0.50 - 1.30 mg/dL    eGFR 86 >60 mL/min/1.73m*2    Calcium 8.7 8.6 - 10.3 mg/dL   POCT GLUCOSE   Result Value Ref Range    POCT Glucose 165 (H) 74 - 99 mg/dL       Imaging Results  Vascular US upper extremity venous duplex left   Final Result   Negative study.  No thrombus in the central veins of the  left upper   extremities.        MACRO:   None        Signed by: Anson Pa 6/1/2024 5:10 AM   Dictation workstation:   [id number]      Lower extremity venous duplex bilateral   Final Result   Negative study.  No deep venous thrombosis of the  bilateral lower   extremity.        MACRO:   None        Signed by: Anson Pa 6/1/2024 5:17 AM   Dictation workstation:   [id number]      XR chest 1 view   Final Result   1. No acute pulmonary pathology.   2. Atelectasis in the right lower lobe.   Signed by Scottie Barboza MD          Medications:  aspirin, 81 mg, oral, Daily  calcium  carbonate-vitamin D3, 1 tablet, oral, Daily  DULoxetine, 20 mg, oral, Daily  furosemide, 40 mg, intravenous, Once  insulin glargine, 3 Units, subcutaneous, q24h  insulin lispro, 0-10 Units, subcutaneous, q4h  levothyroxine, 75 mcg, oral, Daily  magnesium hydroxide, 10 mL, oral, Once  magnesium hydroxide, 10 mL, oral, Once  melatonin, 3 mg, oral, Daily  metoprolol succinate XL, 12.5 mg, oral, BID  pantoprazole, 40 mg, intravenous, Daily  polyethylene glycol, 17 g, oral, Daily  tamsulosin, 0.4 mg, oral, Daily       PRN medications: acetaminophen, acetaminophen, dextrose, dextrose, glucagon, glucagon, HYDROmorphone, HYDROmorphone, ondansetron ODT **OR** ondansetron        Assessment/Plan   Principal Problem:    Shortness of breath    Shortness of breath  - oxygen as needed, does not appear to wear this at home  - Documented VS in the ED show 94-98% on RA, then one value of 86% on RA,   - may benefit from evaluation for home oxygen prior to dc  - CXR unremarkable  - has significant B/L LE edema, check d-dimer for PE     Bilateral LE edema  - US b/l LE neg  -Will try Lasix 40mg IV today again     Generalized weakness  - recent 2 week hospitalization at [facility], likely deconditioned  - PT/OT eval  - Wife was concerned about taking him home, likely needs placement.      Recent GIB  - monitor for dec in Hb  - dx with iron deficiency at [facility], received infusions, not dc with iron supplements     Atrial fibrillation  - Coumadin stopped with recent admission to [facility]  - continue telemetry  - Metoprolol for rate control     Suspected aspiration  - Patient and wife would like patient to eat, understand the risks      CAD   - continue ASA, statin, BB    Constipation  -CXR with large amount of stool in colon upon my review of the imagine.   -Milk of Mag again today   -May also be contributing to his SOB.     DVT Prophylaxis:  NA Recent GIB     Disposition  Will see if we can DC tomorrow     Ander Palumbo, DO [facility]

## 2024-06-03 ENCOUNTER — APPOINTMENT (OUTPATIENT)
Dept: RADIOLOGY | Facility: HOSPITAL | Age: 84
DRG: 392 | End: 2024-06-03
Payer: MEDICARE

## 2024-06-03 LAB
GLUCOSE BLD MANUAL STRIP-MCNC: 116 MG/DL (ref 74–99)
GLUCOSE BLD MANUAL STRIP-MCNC: 118 MG/DL (ref 74–99)
GLUCOSE BLD MANUAL STRIP-MCNC: 127 MG/DL (ref 74–99)
GLUCOSE BLD MANUAL STRIP-MCNC: 148 MG/DL (ref 74–99)
GLUCOSE BLD MANUAL STRIP-MCNC: 154 MG/DL (ref 74–99)
GLUCOSE BLD MANUAL STRIP-MCNC: 160 MG/DL (ref 74–99)

## 2024-06-03 PROCEDURE — C9113 INJ PANTOPRAZOLE SODIUM, VIA: HCPCS | Performed by: HOSPITALIST

## 2024-06-03 PROCEDURE — 2500000001 HC RX 250 WO HCPCS SELF ADMINISTERED DRUGS (ALT 637 FOR MEDICARE OP): Performed by: INTERNAL MEDICINE

## 2024-06-03 PROCEDURE — 2500000004 HC RX 250 GENERAL PHARMACY W/ HCPCS (ALT 636 FOR OP/ED): Performed by: HOSPITALIST

## 2024-06-03 PROCEDURE — 2500000002 HC RX 250 W HCPCS SELF ADMINISTERED DRUGS (ALT 637 FOR MEDICARE OP, ALT 636 FOR OP/ED): Performed by: HOSPITALIST

## 2024-06-03 PROCEDURE — 99232 SBSQ HOSP IP/OBS MODERATE 35: CPT | Performed by: INTERNAL MEDICINE

## 2024-06-03 PROCEDURE — 1200000002 HC GENERAL ROOM WITH TELEMETRY DAILY

## 2024-06-03 PROCEDURE — 92526 ORAL FUNCTION THERAPY: CPT | Mod: GN

## 2024-06-03 PROCEDURE — 74018 RADEX ABDOMEN 1 VIEW: CPT

## 2024-06-03 PROCEDURE — 82947 ASSAY GLUCOSE BLOOD QUANT: CPT

## 2024-06-03 PROCEDURE — 74018 RADEX ABDOMEN 1 VIEW: CPT | Performed by: RADIOLOGY

## 2024-06-03 PROCEDURE — 2500000001 HC RX 250 WO HCPCS SELF ADMINISTERED DRUGS (ALT 637 FOR MEDICARE OP): Performed by: HOSPITALIST

## 2024-06-03 RX ORDER — ZINC GLUCONATE 50 MG
50 TABLET ORAL DAILY
COMMUNITY

## 2024-06-03 RX ORDER — CALCIUM CARBONATE 200(500)MG
2 TABLET,CHEWABLE ORAL 3 TIMES DAILY PRN
COMMUNITY

## 2024-06-03 RX ORDER — ADHESIVE BANDAGE
30 BANDAGE TOPICAL DAILY PRN
COMMUNITY

## 2024-06-03 RX ORDER — FUROSEMIDE 40 MG/1
60 TABLET ORAL DAILY
COMMUNITY

## 2024-06-03 RX ORDER — CARBOXYMETHYLCELLULOSE SODIUM AND GLYCERIN 5; 9 MG/ML; MG/ML
1 SOLUTION/ DROPS OPHTHALMIC AS NEEDED
COMMUNITY

## 2024-06-03 RX ORDER — ATORVASTATIN CALCIUM 10 MG/1
10 TABLET, FILM COATED ORAL DAILY
COMMUNITY

## 2024-06-03 RX ORDER — INSULIN LISPRO 100 [IU]/ML
0-5 INJECTION, SOLUTION INTRAVENOUS; SUBCUTANEOUS
COMMUNITY

## 2024-06-03 RX ORDER — GUAIFENESIN 600 MG/1
600 TABLET, EXTENDED RELEASE ORAL 2 TIMES DAILY PRN
COMMUNITY

## 2024-06-03 RX ORDER — WARFARIN 3 MG/1
1.5 TABLET ORAL
COMMUNITY

## 2024-06-03 RX ORDER — DULOXETIN HYDROCHLORIDE 20 MG/1
20 CAPSULE, DELAYED RELEASE ORAL DAILY
COMMUNITY

## 2024-06-03 RX ORDER — WARFARIN 3 MG/1
3 TABLET ORAL
COMMUNITY

## 2024-06-03 RX ORDER — ASCORBIC ACID 250 MG
250 TABLET ORAL DAILY
COMMUNITY

## 2024-06-03 RX ORDER — GLUCOSAMINE HCL 500 MG
1-3 TABLET ORAL DAILY
COMMUNITY

## 2024-06-03 RX ORDER — METOPROLOL SUCCINATE 25 MG/1
0.5 TABLET, EXTENDED RELEASE ORAL 2 TIMES DAILY
COMMUNITY

## 2024-06-03 RX ORDER — PANTOPRAZOLE SODIUM 40 MG/1
40 TABLET, DELAYED RELEASE ORAL DAILY PRN
COMMUNITY

## 2024-06-03 RX ORDER — POLYETHYLENE GLYCOL 3350 17 G/17G
17 POWDER, FOR SOLUTION ORAL DAILY PRN
COMMUNITY

## 2024-06-03 RX ORDER — ASPIRIN 81 MG/1
81 TABLET ORAL EVERY OTHER DAY
COMMUNITY

## 2024-06-03 RX ORDER — LEVOTHYROXINE SODIUM 75 UG/1
75 TABLET ORAL DAILY
COMMUNITY

## 2024-06-03 RX ORDER — RIBOFLAVIN (VITAMIN B2) 100 MG
100 TABLET ORAL DAILY
COMMUNITY

## 2024-06-03 RX ORDER — TALC
3 POWDER (GRAM) TOPICAL NIGHTLY
COMMUNITY

## 2024-06-03 RX ORDER — INSULIN GLARGINE 100 [IU]/ML
3 INJECTION, SOLUTION SUBCUTANEOUS EVERY 24 HOURS
COMMUNITY

## 2024-06-03 RX ORDER — ACETAMINOPHEN 325 MG/1
650 TABLET ORAL EVERY 6 HOURS PRN
COMMUNITY

## 2024-06-03 RX ORDER — TAMSULOSIN HYDROCHLORIDE 0.4 MG/1
0.4 CAPSULE ORAL DAILY
COMMUNITY

## 2024-06-03 RX ADMIN — INSULIN GLARGINE 3 UNITS: 100 INJECTION, SOLUTION SUBCUTANEOUS at 00:20

## 2024-06-03 RX ADMIN — METOPROLOL SUCCINATE 12.5 MG: 25 TABLET, EXTENDED RELEASE ORAL at 22:13

## 2024-06-03 RX ADMIN — Medication 1 TABLET: at 10:20

## 2024-06-03 RX ADMIN — ASPIRIN 81 MG: 81 TABLET, COATED ORAL at 10:19

## 2024-06-03 RX ADMIN — LEVOTHYROXINE SODIUM 75 MCG: 75 TABLET ORAL at 06:22

## 2024-06-03 RX ADMIN — DULOXETINE HYDROCHLORIDE 20 MG: 20 CAPSULE, DELAYED RELEASE ORAL at 10:20

## 2024-06-03 RX ADMIN — TAMSULOSIN HYDROCHLORIDE 0.4 MG: 0.4 CAPSULE ORAL at 10:19

## 2024-06-03 RX ADMIN — Medication 3 MG: at 22:13

## 2024-06-03 RX ADMIN — POLYETHYLENE GLYCOL 3350 17 G: 17 POWDER, FOR SOLUTION ORAL at 10:19

## 2024-06-03 RX ADMIN — PANTOPRAZOLE SODIUM 40 MG: 40 INJECTION, POWDER, FOR SOLUTION INTRAVENOUS at 11:04

## 2024-06-03 RX ADMIN — INSULIN LISPRO 2 UNITS: 100 INJECTION, SOLUTION INTRAVENOUS; SUBCUTANEOUS at 13:09

## 2024-06-03 ASSESSMENT — COGNITIVE AND FUNCTIONAL STATUS - GENERAL
MOVING TO AND FROM BED TO CHAIR: TOTAL
MOBILITY SCORE: 8
EATING MEALS: A LITTLE
STANDING UP FROM CHAIR USING ARMS: TOTAL
TURNING FROM BACK TO SIDE WHILE IN FLAT BAD: A LOT
DRESSING REGULAR LOWER BODY CLOTHING: A LOT
HELP NEEDED FOR BATHING: A LOT
CLIMB 3 TO 5 STEPS WITH RAILING: TOTAL
MOVING FROM LYING ON BACK TO SITTING ON SIDE OF FLAT BED WITH BEDRAILS: A LOT
DRESSING REGULAR UPPER BODY CLOTHING: A LOT
PERSONAL GROOMING: A LOT
TOILETING: A LOT
WALKING IN HOSPITAL ROOM: TOTAL
DAILY ACTIVITIY SCORE: 13

## 2024-06-03 ASSESSMENT — PAIN SCALES - GENERAL
PAINLEVEL_OUTOF10: 0 - NO PAIN

## 2024-06-03 ASSESSMENT — PAIN - FUNCTIONAL ASSESSMENT
PAIN_FUNCTIONAL_ASSESSMENT: 0-10

## 2024-06-03 NOTE — PROGRESS NOTES
Speech-Language Pathology    Inpatient  Speech-Language Pathology Dysphagia Treatment    Patient Name: Maverick Tai  MRN: [id number]  : 1940  Today's Date: 24   Time Calculation  Start Time: 830  Stop Time: 859  Time Calculation (min): 29 min        Subjective:     Alert with confusion    Objective:  Goals:   Pt will perform OM and pharyngeal ex's, using ice chips, with SLP only, in order to strengthen musculature in prep for PO.        Therapeutic Swallow Intervention : PO Trials    Treatment Results/ Swallow Comments: Diet ordered and patient consuming breakfast items at time of SLP arrival to room. Patient seated upright in bed and consuming foods spontaneously with slow rate and small bolus size. Active mastication of solids achieved with full oral clearance of boluses consumed. Patient with no overt s/s aspiration. Voice and respirations unchanged from baseline following oral intake. Delayed, moist cough noted, however not felt to be related to dysphagia. Patient with recent MBSS at [facility] on 24, which revealed transient laryngeal penetration with thin liquids only during sequential drinking task. Moist cough reported at that time with nothing viewed in laryngeal vestibule when fluoro activated, per MBSS note. Regular/Thin diet recommended following exam with no further SLP follow up advised. Communicated with Dr. Palumbo, who did not have concerns for aspiration. Advised of availability of repeat MBSS in the event concerns for aspiration exist.     Assessment:  SLP TX Intervention Outcome: Making Progress Towards Goals    Patient demonstrating adequate tolerance of present diet. Would consider MBSS if pulmonary changes develop or if dysphagia symptoms become exacerbated. Feel patient currently demonstrating a functional swallowing mechanism. Patient does not appear impulsive during self feeding tasks. No further SLP intervention required at this time. Patient denies difficulty  with deglutition at time of session.    Plan:    CONTINUE REGULAR/THIN DIET  DISCHARGE FROM SLP SERVICE  CONSIDER MBSS IF DYSPHAGIA SYMPTOMS DEVELOP OR PULMONARY CHANGES ARISE WITH CONCERN FOR ASPIRATION.    SLP TX Plan: Discharge from Speech Therapy  SLP Plan: No skilled SLP  SLP Frequency: N/A  Duration: N/A  SLP Discharge Recommendations: D/C as patient is functional for this level of care  Discussed POC: Patient, Nursing, Physician  Discussed Risks/Benefits: Yes  Patient/Caregiver Agreeable: Yes

## 2024-06-03 NOTE — CARE PLAN
Problem: Pain  Goal: My pain/discomfort is manageable  Outcome: Progressing     Problem: Daily Care  Goal: Daily care needs are met  Outcome: Progressing     Problem: Psychosocial Needs  Goal: Demonstrates ability to cope with hospitalization/illness  Outcome: Progressing   The patient's goals for the shift include      The clinical goals for the shift include pt will remain safe throughout the shift

## 2024-06-03 NOTE — PROGRESS NOTES
"Maverick Tai is a 83 y.o. male on day 3 of admission presenting with Shortness of breath.    Subjective   Pretty tired today. Doesn't want to work with PT/OT doesn't want to go home. Still waiting on BM.        Objective     VITALS  Blood pressure 97/58, pulse 97, temperature 36.4 °C (97.5 °F), resp. rate 18, height 1.778 m (5' 10\"), weight 90.7 kg (200 lb), SpO2 92%.  Physical Exam  Vitals and nursing note reviewed.   Constitutional:       General: He is not in acute distress.     Appearance: Normal appearance. He is not ill-appearing.   HENT:      Head: Normocephalic and atraumatic.      Mouth/Throat:      Mouth: Mucous membranes are moist.      Pharynx: Oropharynx is clear.   Eyes:      Extraocular Movements: Extraocular movements intact.      Conjunctiva/sclera: Conjunctivae normal.   Cardiovascular:      Rate and Rhythm: Normal rate and regular rhythm.      Heart sounds: Normal heart sounds. No murmur heard.     No friction rub. No gallop.   Pulmonary:      Effort: Pulmonary effort is normal.      Breath sounds: No wheezing or rales.   Abdominal:      General: Bowel sounds are normal. There is no distension.      Palpations: Abdomen is soft.      Tenderness: There is no abdominal tenderness. There is no guarding.   Musculoskeletal:         General: No swelling or tenderness.      Right lower leg: No edema.      Left lower leg: No edema.   Skin:     General: Skin is warm and dry.      Capillary Refill: Capillary refill takes less than 2 seconds.   Neurological:      General: No focal deficit present.      Mental Status: He is easily aroused.           Intake/Output last 3 Shifts:  No intake/output data recorded.    Relevant Results  Results for orders placed or performed during the hospital encounter of 05/31/24 (from the past 24 hour(s))   POCT GLUCOSE   Result Value Ref Range    POCT Glucose 216 (H) 74 - 99 mg/dL   POCT GLUCOSE   Result Value Ref Range    POCT Glucose 158 (H) 74 - 99 mg/dL   POCT GLUCOSE "   Result Value Ref Range    POCT Glucose 167 (H) 74 - 99 mg/dL   POCT GLUCOSE   Result Value Ref Range    POCT Glucose 122 (H) 74 - 99 mg/dL   POCT GLUCOSE   Result Value Ref Range    POCT Glucose 116 (H) 74 - 99 mg/dL   POCT GLUCOSE   Result Value Ref Range    POCT Glucose 148 (H) 74 - 99 mg/dL       Imaging Results  Vascular US upper extremity venous duplex left   Final Result   Negative study.  No thrombus in the central veins of the  left upper   extremities.        MACRO:   None        Signed by: Anson Pa 6/1/2024 5:10 AM   Dictation workstation:   [id number]      Lower extremity venous duplex bilateral   Final Result   Negative study.  No deep venous thrombosis of the  bilateral lower   extremity.        MACRO:   None        Signed by: Anson Pa 6/1/2024 5:17 AM   Dictation workstation:   [id number]      XR chest 1 view   Final Result   1. No acute pulmonary pathology.   2. Atelectasis in the right lower lobe.   Signed by Scottie Barboza MD      XR abdomen 1 view    (Results Pending)       Medications:  aspirin, 81 mg, oral, Daily  calcium carbonate-vitamin D3, 1 tablet, oral, Daily  DULoxetine, 20 mg, oral, Daily  insulin glargine, 3 Units, subcutaneous, q24h  insulin lispro, 0-10 Units, subcutaneous, q4h  levothyroxine, 75 mcg, oral, Daily  magnesium hydroxide, 10 mL, oral, Once  melatonin, 3 mg, oral, Daily  metoprolol succinate XL, 12.5 mg, oral, BID  pantoprazole, 40 mg, intravenous, Daily  polyethylene glycol, 17 g, oral, Daily  tamsulosin, 0.4 mg, oral, Daily       PRN medications: acetaminophen, acetaminophen, dextrose, dextrose, glucagon, glucagon, HYDROmorphone, HYDROmorphone, artificial tears, ondansetron ODT **OR** ondansetron        Assessment/Plan   Principal Problem:    Shortness of breath    Shortness of breath  - oxygen as needed, does not appear to wear this at home  - Documented VS in the ED show 94-98% on RA, then one value of 86% on RA,   - may benefit from evaluation  for home oxygen prior to dc  - CXR unremarkable  - has significant B/L LE edema, check d-dimer for PE     Bilateral LE edema  - US b/l LE neg  -Will try Lasix 40mg IV today again     Generalized weakness  - recent 2 week hospitalization at [facility], likely deconditioned  - PT/OT eval  - Wife was concerned about taking him home, likely needs placement.      Recent GIB  - monitor for dec in Hb  - dx with iron deficiency at [facility], received infusions, not dc with iron supplements     Atrial fibrillation  - Coumadin stopped with recent admission to [facility]  - continue telemetry  - Metoprolol for rate control     Suspected aspiration  - Patient and wife would like patient to eat, understand the risks      CAD   - continue ASA, statin, BB    Constipation  -CXR with large amount of stool in colon upon my review of the imagine.   -Milk of Mag again today   -May also be contributing to his SOB.   -KUB ordered     DVT Prophylaxis:  NA Recent GIB     Disposition  Will see if we can DC tomorrow     Ander Palumbo, DO [facility]

## 2024-06-03 NOTE — PROGRESS NOTES
06/03/24 1155   Discharge Planning   Patient expects to be discharged to: Home with [facility]     Per conversation with SW, patient's wife refusing SNF placement, stated she will have the patient discharge home with [facility], per notes patient may be med ready tomorrow for discharge.

## 2024-06-03 NOTE — PROGRESS NOTES
Occupational Therapy                 Therapy Communication Note    Patient Name: Maverick Tai  MRN: [id number]  Today's Date: 6/3/2024     Discipline: Occupational Therapy    Missed Visit Reason: Missed Visit Reason: Patient refused (Upon arrival pt supine in bed, lethargic with eyes closed. Pt declining therapy at this time d/t pain and fatigue, despite max education and encouragement pt continued to decline. RN aware.)    Missed Time: Attempt

## 2024-06-03 NOTE — PROGRESS NOTES
06/03/24 1248   Discharge Planning   Patient expects to be discharged to: Home with [facility]     Spoke with patient's wife via phone. Wife states she is not interested in SNF at this time. Wife plans to bring patient home with [facility]. Per wife, he was previously active with [facility] and there had been talks about potentially moving patient to hospice. Wife stated that she has not made a decision yet, but has been considering Hospice as an option and plans to continue conversation with [facility] and Hospice once home. Wife aware that patient will be discharged tomorrow and requested that patient be set up with stretcher transport. Referral sent to [facility]. SW will continue to follow.

## 2024-06-03 NOTE — PROGRESS NOTES
"Pharmacy Medication History Review    Per [facility] rx and wife. So [facility] had Lantus insulin on file but Wife thought it was Levemir. Either way was for 3 units. She said gave med list to Nurse but when I looked in the chart didn't see it.     Maverick Tai is a 83 y.o. male admitted for Shortness of breath. Pharmacy reviewed the patient's [id number] medications and allergies for accuracy.    The list below reflectives the updated PTA list. Please review each medication in order reconciliation for additional clarification and justification.       The list below reflectives the updated allergy list. Please review each documented allergy for additional clarification and justification.  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      ABSOLUTE  HEP INDUCED    Bee Pollen Not Specified Other Environmental: Sneezing, runny nose, watery eyes    Cephalosporins Not Specified Other Concern about potential liver problems    Cetirizine Not Specified Other increased LFT    Ciprofloxacin Not Specified Other possible liver toxcicity    Clarithromycin Not Specified Other possible liver problem.    Desloratadine Not Specified GI intolerance, GI Upset, Other \"hyper\"    Diphenhydramine Not Specified Other \"hyper\"    Doxycycline Not Specified Other possible liver dysfunction after administration.    Gentamicin Not Specified Other Possible per pt causes increases symptoms of tinnitus    Nickel Not Specified Other patch test confirmed by     Sitagliptin Low Rash rash in inner thighs            Below are " additional concerns with the patient's PTA list.  Prior to Admission Medications   Prescriptions Last Dose Informant   DULoxetine (Cymbalta) 20 mg DR capsule     Sig: Take 1 capsule (20 mg) by mouth once daily. Do not crush or chew.   Lactobacillus acidophilus (PROBIOTIC ORAL)     Sig: Take 1 capsule by mouth once daily.   acetaminophen (Tylenol) 325 mg tablet     Sig: Take 2 tablets (650 mg) by mouth every 6 hours if needed for mild pain (1 - 3).   ascorbic acid (Vitamin C) 250 mg tablet     Sig: Take 1 tablet (250 mg) by mouth once daily.   aspirin 81 mg EC tablet     Sig: Take 1 tablet (81 mg) by mouth every other day.   atorvastatin (Lipitor) 10 mg tablet     Sig: Take 1 tablet (10 mg) by mouth once daily.   calcium carbonate (Tums) 200 mg calcium chewable tablet     Sig: Chew 2 tablets (1,000 mg) 3 times a day as needed for indigestion or heartburn.   carboxymethylcellulose-glycern (Refresh Optive) 0.5-0.9 % drops     Sig: Administer 1 drop into affected eye(s) if needed.   furosemide (Lasix) 40 mg tablet     Sig: Take 1.5 tablets (60 mg) by mouth once daily.   guaiFENesin (Mucinex) 600 mg 12 hr tablet     Sig: Take 1 tablet (600 mg) by mouth 2 times a day as needed for cough. Do not crush, chew, or split.   insulin glargine (Lantus U-100 Insulin) 100 unit/mL injection     Sig: Inject 3 Units under the skin once every 24 hours. Take as directed per insulin instructions.   insulin lispro (HumaLOG) 100 unit/mL injection     Sig: Inject 0-0.05 mL (0-5 Units) under the skin 4 times a day before meals. Take as directed per insulin instructions. Per sliding scale: if 151-200 give 1, if 201-250 give 2, if 251-300 give 3, if 301-350 give 4, if 351-400 give 5, if more than 400 give 5 and call MD   levothyroxine (Synthroid, Levoxyl) 75 mcg tablet     Sig: Take 1 tablet (75 mcg) by mouth early in the morning.. Take on an empty stomach at the same time each day, either 30 to 60 minutes prior to breakfast   magnesium  carb,citrate,oxide (MAGNESIUM COMPLEX ORAL)     Sig: Take 1 tablet by mouth once daily.   magnesium hydroxide (Milk of Magnesia) 400 mg/5 mL suspension     Sig: Take 30 mL by mouth once daily as needed for constipation.   melatonin 3 mg tablet     Sig: Take 1 tablet (3 mg) by mouth once daily at bedtime.   metoprolol succinate XL (Toprol-XL) 25 mg 24 hr tablet     Sig: Take 0.5 tablets (12.5 mg) by mouth 2 times a day. Do not crush or chew.   pantoprazole (ProtoNix) 40 mg EC tablet     Sig: Take 1 tablet (40 mg) by mouth once daily as needed. Do not crush, chew, or split.   polyethylene glycol (Glycolax, Miralax) 17 gram packet     Sig: Take 17 g by mouth once daily as needed.   riboflavin (Vitamin B-2) 100 mg tablet tablet     Sig: Take 1 tablet (100 mg) by mouth once daily.   tamsulosin (Flomax) 0.4 mg 24 hr capsule     Sig: Take 1 capsule (0.4 mg) by mouth once daily.   ubidecarenone (coenzyme Q10) 100 mg tablet     Sig: Take 1-3 tablets by mouth once daily.   warfarin (Coumadin) 3 mg tablet     Sig: Take 1 tablet (3 mg) by mouth once a day on Sunday, Tuesday, Thursday, and Saturday. Take as directed per After Visit Summary.   warfarin (Coumadin) 3 mg tablet     Sig: Take 0.5 tablets (1.5 mg) by mouth once a day on Monday, Wednesday, and Friday. Take as directed per After Visit Summary.   zinc gluconate 50 mg tablet     Sig: Take 1 tablet (50 mg of elemental zinc) by mouth once daily.      Facility-Administered Medications: None        Nan Gaston

## 2024-06-03 NOTE — CARE PLAN
The patient's goals for the shift include      The clinical goals for the shift include pt will remain safe throughout the shift    Over the shift, the patient did make progress toward the following goals.   Problem: Daily Care  Goal: Daily care needs are met  Outcome: Progressing     Problem: Psychosocial Needs  Goal: Demonstrates ability to cope with hospitalization/illness  Outcome: Progressing     Problem: Psychosocial Needs  Goal: Collaborate with me, my family, and caregiver to identify my specific goals  Outcome: Progressing

## 2024-06-04 VITALS
WEIGHT: 199.96 LBS | BODY MASS INDEX: 28.63 KG/M2 | DIASTOLIC BLOOD PRESSURE: 84 MMHG | SYSTOLIC BLOOD PRESSURE: 129 MMHG | TEMPERATURE: 97.7 F | RESPIRATION RATE: 20 BRPM | HEART RATE: 89 BPM | HEIGHT: 70 IN | OXYGEN SATURATION: 100 %

## 2024-06-04 LAB
GLUCOSE BLD MANUAL STRIP-MCNC: 128 MG/DL (ref 74–99)
GLUCOSE BLD MANUAL STRIP-MCNC: 130 MG/DL (ref 74–99)
GLUCOSE BLD MANUAL STRIP-MCNC: 138 MG/DL (ref 74–99)
GLUCOSE BLD MANUAL STRIP-MCNC: 138 MG/DL (ref 74–99)

## 2024-06-04 PROCEDURE — 2500000002 HC RX 250 W HCPCS SELF ADMINISTERED DRUGS (ALT 637 FOR MEDICARE OP, ALT 636 FOR OP/ED): Performed by: HOSPITALIST

## 2024-06-04 PROCEDURE — 99239 HOSP IP/OBS DSCHRG MGMT >30: CPT | Performed by: INTERNAL MEDICINE

## 2024-06-04 PROCEDURE — 2500000001 HC RX 250 WO HCPCS SELF ADMINISTERED DRUGS (ALT 637 FOR MEDICARE OP): Performed by: HOSPITALIST

## 2024-06-04 PROCEDURE — 2500000001 HC RX 250 WO HCPCS SELF ADMINISTERED DRUGS (ALT 637 FOR MEDICARE OP): Performed by: INTERNAL MEDICINE

## 2024-06-04 PROCEDURE — 2500000004 HC RX 250 GENERAL PHARMACY W/ HCPCS (ALT 636 FOR OP/ED): Performed by: HOSPITALIST

## 2024-06-04 PROCEDURE — 82947 ASSAY GLUCOSE BLOOD QUANT: CPT

## 2024-06-04 PROCEDURE — 2500000004 HC RX 250 GENERAL PHARMACY W/ HCPCS (ALT 636 FOR OP/ED): Performed by: INTERNAL MEDICINE

## 2024-06-04 PROCEDURE — C9113 INJ PANTOPRAZOLE SODIUM, VIA: HCPCS | Performed by: HOSPITALIST

## 2024-06-04 RX ORDER — FUROSEMIDE 10 MG/ML
40 INJECTION INTRAMUSCULAR; INTRAVENOUS ONCE
Status: COMPLETED | OUTPATIENT
Start: 2024-06-04 | End: 2024-06-04

## 2024-06-04 RX ADMIN — INSULIN GLARGINE 3 UNITS: 100 INJECTION, SOLUTION SUBCUTANEOUS at 01:00

## 2024-06-04 RX ADMIN — PANTOPRAZOLE SODIUM 40 MG: 40 INJECTION, POWDER, FOR SOLUTION INTRAVENOUS at 09:47

## 2024-06-04 RX ADMIN — POLYETHYLENE GLYCOL 3350 17 G: 17 POWDER, FOR SOLUTION ORAL at 09:38

## 2024-06-04 RX ADMIN — ASPIRIN 81 MG: 81 TABLET, COATED ORAL at 09:37

## 2024-06-04 RX ADMIN — TAMSULOSIN HYDROCHLORIDE 0.4 MG: 0.4 CAPSULE ORAL at 09:37

## 2024-06-04 RX ADMIN — DULOXETINE HYDROCHLORIDE 20 MG: 20 CAPSULE, DELAYED RELEASE ORAL at 09:38

## 2024-06-04 RX ADMIN — METOPROLOL SUCCINATE 12.5 MG: 25 TABLET, EXTENDED RELEASE ORAL at 09:37

## 2024-06-04 RX ADMIN — FUROSEMIDE 40 MG: 10 INJECTION, SOLUTION INTRAMUSCULAR; INTRAVENOUS at 09:47

## 2024-06-04 RX ADMIN — LEVOTHYROXINE SODIUM 75 MCG: 75 TABLET ORAL at 06:30

## 2024-06-04 RX ADMIN — Medication 1 TABLET: at 09:36

## 2024-06-04 ASSESSMENT — PAIN SCALES - GENERAL: PAINLEVEL_OUTOF10: 0 - NO PAIN

## 2024-06-04 ASSESSMENT — COGNITIVE AND FUNCTIONAL STATUS - GENERAL
CLIMB 3 TO 5 STEPS WITH RAILING: TOTAL
TURNING FROM BACK TO SIDE WHILE IN FLAT BAD: A LOT
MOVING FROM LYING ON BACK TO SITTING ON SIDE OF FLAT BED WITH BEDRAILS: TOTAL
DRESSING REGULAR LOWER BODY CLOTHING: A LOT
DAILY ACTIVITIY SCORE: 13
DRESSING REGULAR UPPER BODY CLOTHING: A LOT
TOILETING: A LOT
EATING MEALS: A LITTLE
HELP NEEDED FOR BATHING: A LOT
STANDING UP FROM CHAIR USING ARMS: TOTAL
MOVING TO AND FROM BED TO CHAIR: TOTAL
PERSONAL GROOMING: A LOT
WALKING IN HOSPITAL ROOM: TOTAL
MOBILITY SCORE: 7

## 2024-06-04 NOTE — CARE PLAN
Problem: Pain  Goal: My pain/discomfort is manageable  Outcome: Progressing     Problem: Daily Care  Goal: Daily care needs are met  Outcome: Progressing     Problem: Psychosocial Needs  Goal: Demonstrates ability to cope with hospitalization/illness  Outcome: Progressing  Goal: Collaborate with me, my family, and caregiver to identify my specific goals  Outcome: Progressing     Problem: Discharge Barriers  Goal: My discharge needs are met  Outcome: Progressing   The patient's goals for the shift include      The clinical goals for the shift include Pt to remain safe

## 2024-06-04 NOTE — NURSING NOTE
Tried calling wife Ale August at approximately 1000 to let her know her  was discharged to ask her if she was home and how many stairs to enter residency No answer.    Tried calling wife again at 1056 still no answer. Will continue to call. Also let  know to look out for a phone call.    Wife called back and stated she will be at [facility] at 12:30 pm

## 2024-06-04 NOTE — PROGRESS NOTES
06/04/24 0957   Discharge Planning   Patient expects to be discharged to: home with [facility]     Called patient's wife at [phone] and left a message to let her know the patient will be discharging home today and that we will set up transport and [facility].     10:00 referral sent to [facility] through [facility]

## 2024-06-04 NOTE — DISCHARGE SUMMARY
Discharge Diagnosis  Shortness of breath    Issues Requiring Follow-Up  PCP follow up     Discharge Meds     Your medication list        CONTINUE taking these medications        Instructions Last Dose Given Next Dose Due   acetaminophen 325 mg tablet  Commonly known as: Tylenol           ascorbic acid 250 mg tablet  Commonly known as: Vitamin C           aspirin 81 mg EC tablet           atorvastatin 10 mg tablet  Commonly known as: Lipitor           calcium carbonate 200 mg calcium chewable tablet  Commonly known as: Tums           coenzyme Q10 100 mg tablet           DULoxetine 20 mg DR capsule  Commonly known as: Cymbalta           Flomax 0.4 mg 24 hr capsule  Generic drug: tamsulosin           furosemide 40 mg tablet  Commonly known as: Lasix           guaiFENesin 600 mg 12 hr tablet  Commonly known as: Mucinex           insulin lispro 100 unit/mL injection  Commonly known as: HumaLOG           Lantus U-100 Insulin 100 unit/mL injection  Generic drug: insulin glargine           levothyroxine 75 mcg tablet  Commonly known as: Synthroid, Levoxyl           MAGNESIUM COMPLEX ORAL           magnesium hydroxide 400 mg/5 mL suspension  Commonly known as: Milk of Magnesia           melatonin 3 mg tablet           metoprolol succinate XL 25 mg 24 hr tablet  Commonly known as: Toprol-XL           pantoprazole 40 mg EC tablet  Commonly known as: ProtoNix           polyethylene glycol 17 gram packet  Commonly known as: Glycolax, Miralax           PROBIOTIC ORAL           Refresh Optive 0.5-0.9 % drops  Generic drug: carboxymethylcellulose-glycern           Vitamin B-2 100 mg tablet tablet  Generic drug: riboflavin           warfarin 3 mg tablet  Commonly known as: Coumadin           warfarin 3 mg tablet  Commonly known as: Coumadin           zinc gluconate 50 mg tablet                    Test Results Pending At Discharge  Pending Labs       No current pending labs.            Procedures       Hospital Course   Maverick was  "admitted to hospital with increased shortness of breath and desaturation.  Chest x-ray was fairly unremarkable.  Lower extremity DVT ultrasound was negative.  He is recently been in and out of the hospital for various different illnesses and has had overall general decline.  He also had a recent GI bleed for which she was taken off his warfarin.  He is on warfarin for atrial fibrillation.  He was monitored here hospital in did undergo some diuresis as he does have quite a bit of fluid throughout the system.  He is continue to require oxygen though the need to cut down.  Evaluated by the respiratory therapist and found to need 2 L via nasal cannula continuous.  Did have a long conversation with patient's wife his general overall decline is likely not good and that she should expect further decline as he does not really participate with therapy.  She noted understanding.  She was offered a stay to skilled nursing facility but has refused and is going to take her  home.  At this time he is otherwise hemodynamically stable appropriate for discharge.  This plan was discussed with him and his wife and they are both in agreement.  All questions were answered.    Blood pressure 135/73, pulse 85, temperature 36.6 °C (97.9 °F), temperature source Axillary, resp. rate 20, height 1.778 m (5' 10\"), weight 90.7 kg (200 lb), SpO2 100%.  Pertinent Physical Exam At Time of Discharge  Physical Exam  Vitals and nursing note reviewed.   Constitutional:       General: He is not in acute distress.     Appearance: Normal appearance. He is not ill-appearing.   HENT:      Head: Normocephalic and atraumatic.      Mouth/Throat:      Mouth: Mucous membranes are moist.      Pharynx: Oropharynx is clear.   Eyes:      Extraocular Movements: Extraocular movements intact.      Conjunctiva/sclera: Conjunctivae normal.   Cardiovascular:      Rate and Rhythm: Normal rate and regular rhythm.      Heart sounds: Normal heart sounds. No murmur " heard.     No friction rub. No gallop.   Pulmonary:      Effort: Pulmonary effort is normal.      Breath sounds: No wheezing or rales.   Abdominal:      General: Bowel sounds are normal. There is no distension.      Palpations: Abdomen is soft.      Tenderness: There is no abdominal tenderness. There is no guarding.   Musculoskeletal:         General: No swelling or tenderness.      Right lower leg: No edema.      Left lower leg: No edema.   Skin:     General: Skin is warm and dry.      Capillary Refill: Capillary refill takes less than 2 seconds.   Neurological:      General: No focal deficit present.      Mental Status: He is easily aroused.         Outpatient Follow-Up  No future appointments.      Ander Palumbo DO [facility]     Time spent during this discharge: >30 min      After writing this note he was able to get a hold of patient's wife.  She did have several questions about hospice.  I did inform her that I do feel he is appropriate for hospice given his overall decline in the last several months.  She noted understanding and was thankful for care.  She is already in contact with hospice [facility].  She will continue to work with them.    Ander Palumbo DO [facility]   6/4/2024

## 2024-06-04 NOTE — SIGNIFICANT EVENT
Patient dropped to 88% on room air at rest, and qualified for 2 liters of continuous oxygen. [facility] [employer] was contacted. Patient was given tank and instructions as well as contact number for [facility].

## 2024-06-04 NOTE — NURSING NOTE
Discharge instructions provided using teach back method. Pt's health related  risk factors discussed with pt. pt educated to look for any worsening sign and symptoms. Pt educated to seek medical attention if experience any medical emergency. Pt aware to follow up with outpatient clinics as scheduled. Home going meds reviewed with pt. Pt verbalized understanding of disposition and discharge instructions. All questions answered to patient's satisfaction and within nursing scope of practice. Vitals stable, IV removed.     Doxycycline Counseling:  Patient counseled regarding possible photosensitivity and increased risk for sunburn.  Patient instructed to avoid sunlight, if possible.  When exposed to sunlight, patients should wear protective clothing, sunglasses, and sunscreen.  The patient was instructed to call the office immediately if the following severe adverse effects occur:  hearing changes, easy bruising/bleeding, severe headache, or vision changes.  The patient verbalized understanding of the proper use and possible adverse effects of doxycycline.  All of the patient's questions and concerns were addressed.